# Patient Record
Sex: MALE | Race: WHITE | Employment: OTHER | URBAN - METROPOLITAN AREA
[De-identification: names, ages, dates, MRNs, and addresses within clinical notes are randomized per-mention and may not be internally consistent; named-entity substitution may affect disease eponyms.]

---

## 2019-06-13 ENCOUNTER — ANESTHESIA EVENT (OUTPATIENT)
Dept: OPERATING ROOM | Age: 84
DRG: 455 | End: 2019-06-13
Payer: MEDICARE

## 2019-06-14 ENCOUNTER — APPOINTMENT (OUTPATIENT)
Dept: GENERAL RADIOLOGY | Age: 84
DRG: 455 | End: 2019-06-14
Attending: ORTHOPAEDIC SURGERY
Payer: MEDICARE

## 2019-06-14 ENCOUNTER — ANESTHESIA (OUTPATIENT)
Dept: OPERATING ROOM | Age: 84
DRG: 455 | End: 2019-06-14
Payer: MEDICARE

## 2019-06-14 ENCOUNTER — HOSPITAL ENCOUNTER (INPATIENT)
Age: 84
LOS: 5 days | Discharge: NURSING, REHAB OR LONG-TERM CARE | DRG: 455 | End: 2019-06-19
Attending: ORTHOPAEDIC SURGERY | Admitting: ORTHOPAEDIC SURGERY
Payer: MEDICARE

## 2019-06-14 VITALS — DIASTOLIC BLOOD PRESSURE: 76 MMHG | TEMPERATURE: 96.3 F | OXYGEN SATURATION: 94 % | SYSTOLIC BLOOD PRESSURE: 137 MMHG

## 2019-06-14 DIAGNOSIS — I48.20 ATRIAL FIBRILLATION, CHRONIC (HCC): ICD-10-CM

## 2019-06-14 PROBLEM — M48.062 LUMBAR STENOSIS WITH NEUROGENIC CLAUDICATION: Status: ACTIVE | Noted: 2019-06-14

## 2019-06-14 LAB
ABO/RH: NORMAL
ANTIBODY SCREEN: NORMAL
INR BLD: 1.2
PROTHROMBIN TIME: 12.1 SEC (ref 9–11.5)

## 2019-06-14 PROCEDURE — 2500000003 HC RX 250 WO HCPCS: Performed by: NURSE ANESTHETIST, CERTIFIED REGISTERED

## 2019-06-14 PROCEDURE — 2580000003 HC RX 258: Performed by: NURSE PRACTITIONER

## 2019-06-14 PROCEDURE — 0SG30AJ FUSION OF LUMBOSACRAL JOINT WITH INTERBODY FUSION DEVICE, POSTERIOR APPROACH, ANTERIOR COLUMN, OPEN APPROACH: ICD-10-PCS | Performed by: ORTHOPAEDIC SURGERY

## 2019-06-14 PROCEDURE — 00NY0ZZ RELEASE LUMBAR SPINAL CORD, OPEN APPROACH: ICD-10-PCS | Performed by: ORTHOPAEDIC SURGERY

## 2019-06-14 PROCEDURE — 3600000014 HC SURGERY LEVEL 4 ADDTL 15MIN: Performed by: ORTHOPAEDIC SURGERY

## 2019-06-14 PROCEDURE — 2780000010 HC IMPLANT OTHER: Performed by: ORTHOPAEDIC SURGERY

## 2019-06-14 PROCEDURE — 85610 PROTHROMBIN TIME: CPT

## 2019-06-14 PROCEDURE — 0SG30J1 FUSION OF LUMBOSACRAL JOINT WITH SYNTHETIC SUBSTITUTE, POSTERIOR APPROACH, POSTERIOR COLUMN, OPEN APPROACH: ICD-10-PCS | Performed by: ORTHOPAEDIC SURGERY

## 2019-06-14 PROCEDURE — 86850 RBC ANTIBODY SCREEN: CPT

## 2019-06-14 PROCEDURE — 6360000002 HC RX W HCPCS: Performed by: ORTHOPAEDIC SURGERY

## 2019-06-14 PROCEDURE — 1210000000 HC MED SURG R&B

## 2019-06-14 PROCEDURE — 3700000000 HC ANESTHESIA ATTENDED CARE: Performed by: ORTHOPAEDIC SURGERY

## 2019-06-14 PROCEDURE — C1713 ANCHOR/SCREW BN/BN,TIS/BN: HCPCS | Performed by: ORTHOPAEDIC SURGERY

## 2019-06-14 PROCEDURE — 2580000003 HC RX 258: Performed by: NURSE ANESTHETIST, CERTIFIED REGISTERED

## 2019-06-14 PROCEDURE — 2709999900 HC NON-CHARGEABLE SUPPLY: Performed by: ORTHOPAEDIC SURGERY

## 2019-06-14 PROCEDURE — 3600000004 HC SURGERY LEVEL 4 BASE: Performed by: ORTHOPAEDIC SURGERY

## 2019-06-14 PROCEDURE — 6370000000 HC RX 637 (ALT 250 FOR IP): Performed by: ORTHOPAEDIC SURGERY

## 2019-06-14 PROCEDURE — 86900 BLOOD TYPING SEROLOGIC ABO: CPT

## 2019-06-14 PROCEDURE — 4A1004G MONITORING OF CENTRAL NERVOUS ELECTRICAL ACTIVITY, INTRAOPERATIVE, OPEN APPROACH: ICD-10-PCS | Performed by: ORTHOPAEDIC SURGERY

## 2019-06-14 PROCEDURE — 2720000010 HC SURG SUPPLY STERILE: Performed by: ORTHOPAEDIC SURGERY

## 2019-06-14 PROCEDURE — 7100000000 HC PACU RECOVERY - FIRST 15 MIN: Performed by: ORTHOPAEDIC SURGERY

## 2019-06-14 PROCEDURE — 2580000003 HC RX 258: Performed by: ORTHOPAEDIC SURGERY

## 2019-06-14 PROCEDURE — 86901 BLOOD TYPING SEROLOGIC RH(D): CPT

## 2019-06-14 PROCEDURE — 94150 VITAL CAPACITY TEST: CPT

## 2019-06-14 PROCEDURE — 6370000000 HC RX 637 (ALT 250 FOR IP): Performed by: NURSE PRACTITIONER

## 2019-06-14 PROCEDURE — 76000 FLUOROSCOPY <1 HR PHYS/QHP: CPT

## 2019-06-14 PROCEDURE — 7100000001 HC PACU RECOVERY - ADDTL 15 MIN: Performed by: ORTHOPAEDIC SURGERY

## 2019-06-14 PROCEDURE — 01NB0ZZ RELEASE LUMBAR NERVE, OPEN APPROACH: ICD-10-PCS | Performed by: ORTHOPAEDIC SURGERY

## 2019-06-14 PROCEDURE — 01NR0ZZ RELEASE SACRAL NERVE, OPEN APPROACH: ICD-10-PCS | Performed by: ORTHOPAEDIC SURGERY

## 2019-06-14 PROCEDURE — 0SB40ZZ EXCISION OF LUMBOSACRAL DISC, OPEN APPROACH: ICD-10-PCS | Performed by: ORTHOPAEDIC SURGERY

## 2019-06-14 PROCEDURE — 6360000002 HC RX W HCPCS: Performed by: NURSE ANESTHETIST, CERTIFIED REGISTERED

## 2019-06-14 PROCEDURE — 3700000001 HC ADD 15 MINUTES (ANESTHESIA): Performed by: ORTHOPAEDIC SURGERY

## 2019-06-14 DEVICE — 6.5MM REVOLVE POLYAXIAL PEDICLE SCREW, 50MM
Type: IMPLANTABLE DEVICE | Status: FUNCTIONAL
Brand: REVOLVE

## 2019-06-14 DEVICE — RISE SPACER 10X22MM, 7-13MM
Type: IMPLANTABLE DEVICE | Status: FUNCTIONAL
Brand: RISE

## 2019-06-14 DEVICE — 6.5MM REVOLVE POLYAXIAL PEDICLE SCREW, 45MM
Type: IMPLANTABLE DEVICE | Status: FUNCTIONAL
Brand: REVOLVE

## 2019-06-14 DEVICE — REVOLVE LOCKING CAP
Type: IMPLANTABLE DEVICE | Status: FUNCTIONAL
Brand: REVOLVE

## 2019-06-14 DEVICE — 5.5MM CURVED ROD, CONE TIP, 45MM
Type: IMPLANTABLE DEVICE | Status: FUNCTIONAL
Brand: REVERE

## 2019-06-14 DEVICE — GRAFT BNE PTTY 1-2 MM 7.5 CC SYNTH KT APPL ACTIFUSE MIS: Type: IMPLANTABLE DEVICE | Status: FUNCTIONAL

## 2019-06-14 DEVICE — 5.5MM CURVED ROD, CONE TIP, 50MM
Type: IMPLANTABLE DEVICE | Status: FUNCTIONAL
Brand: REVERE

## 2019-06-14 RX ORDER — OXYCODONE HYDROCHLORIDE AND ACETAMINOPHEN 5; 325 MG/1; MG/1
1 TABLET ORAL EVERY 4 HOURS PRN
Status: DISCONTINUED | OUTPATIENT
Start: 2019-06-14 | End: 2019-06-19 | Stop reason: HOSPADM

## 2019-06-14 RX ORDER — FENTANYL CITRATE 50 UG/ML
50 INJECTION, SOLUTION INTRAMUSCULAR; INTRAVENOUS EVERY 10 MIN PRN
Status: DISCONTINUED | OUTPATIENT
Start: 2019-06-14 | End: 2019-06-14 | Stop reason: HOSPADM

## 2019-06-14 RX ORDER — SODIUM CHLORIDE 0.9 % (FLUSH) 0.9 %
10 SYRINGE (ML) INJECTION EVERY 12 HOURS SCHEDULED
Status: DISCONTINUED | OUTPATIENT
Start: 2019-06-14 | End: 2019-06-19 | Stop reason: HOSPADM

## 2019-06-14 RX ORDER — SODIUM CHLORIDE 9 MG/ML
INJECTION, SOLUTION INTRAVENOUS CONTINUOUS
Status: DISCONTINUED | OUTPATIENT
Start: 2019-06-14 | End: 2019-06-19 | Stop reason: HOSPADM

## 2019-06-14 RX ORDER — MAGNESIUM HYDROXIDE 1200 MG/15ML
LIQUID ORAL CONTINUOUS PRN
Status: COMPLETED | OUTPATIENT
Start: 2019-06-14 | End: 2019-06-14

## 2019-06-14 RX ORDER — CARVEDILOL 6.25 MG/1
6.25 TABLET ORAL 2 TIMES DAILY WITH MEALS
Status: DISCONTINUED | OUTPATIENT
Start: 2019-06-14 | End: 2019-06-19 | Stop reason: HOSPADM

## 2019-06-14 RX ORDER — CEFAZOLIN SODIUM 2 G/50ML
2 SOLUTION INTRAVENOUS EVERY 8 HOURS
Status: COMPLETED | OUTPATIENT
Start: 2019-06-14 | End: 2019-06-15

## 2019-06-14 RX ORDER — ONDANSETRON 2 MG/ML
4 INJECTION INTRAMUSCULAR; INTRAVENOUS
Status: DISCONTINUED | OUTPATIENT
Start: 2019-06-14 | End: 2019-06-14 | Stop reason: HOSPADM

## 2019-06-14 RX ORDER — DIAZEPAM 5 MG/1
5 TABLET ORAL EVERY 6 HOURS PRN
Status: DISCONTINUED | OUTPATIENT
Start: 2019-06-14 | End: 2019-06-19 | Stop reason: HOSPADM

## 2019-06-14 RX ORDER — SODIUM CHLORIDE 0.9 % (FLUSH) 0.9 %
10 SYRINGE (ML) INJECTION PRN
Status: DISCONTINUED | OUTPATIENT
Start: 2019-06-14 | End: 2019-06-14 | Stop reason: HOSPADM

## 2019-06-14 RX ORDER — HYDROCODONE BITARTRATE AND ACETAMINOPHEN 5; 325 MG/1; MG/1
2 TABLET ORAL PRN
Status: DISCONTINUED | OUTPATIENT
Start: 2019-06-14 | End: 2019-06-14 | Stop reason: HOSPADM

## 2019-06-14 RX ORDER — SODIUM CHLORIDE 0.9 % (FLUSH) 0.9 %
10 SYRINGE (ML) INJECTION EVERY 12 HOURS SCHEDULED
Status: DISCONTINUED | OUTPATIENT
Start: 2019-06-14 | End: 2019-06-14 | Stop reason: HOSPADM

## 2019-06-14 RX ORDER — LIDOCAINE HYDROCHLORIDE 10 MG/ML
1 INJECTION, SOLUTION EPIDURAL; INFILTRATION; INTRACAUDAL; PERINEURAL
Status: DISCONTINUED | OUTPATIENT
Start: 2019-06-14 | End: 2019-06-14 | Stop reason: HOSPADM

## 2019-06-14 RX ORDER — ONDANSETRON 2 MG/ML
4 INJECTION INTRAMUSCULAR; INTRAVENOUS EVERY 6 HOURS PRN
Status: DISCONTINUED | OUTPATIENT
Start: 2019-06-14 | End: 2019-06-19 | Stop reason: HOSPADM

## 2019-06-14 RX ORDER — AMIODARONE HYDROCHLORIDE 200 MG/1
100 TABLET ORAL DAILY
Status: DISCONTINUED | OUTPATIENT
Start: 2019-06-14 | End: 2019-06-19 | Stop reason: HOSPADM

## 2019-06-14 RX ORDER — ONDANSETRON 2 MG/ML
INJECTION INTRAMUSCULAR; INTRAVENOUS PRN
Status: DISCONTINUED | OUTPATIENT
Start: 2019-06-14 | End: 2019-06-14 | Stop reason: SDUPTHER

## 2019-06-14 RX ORDER — MEPERIDINE HYDROCHLORIDE 25 MG/ML
12.5 INJECTION INTRAMUSCULAR; INTRAVENOUS; SUBCUTANEOUS EVERY 5 MIN PRN
Status: DISCONTINUED | OUTPATIENT
Start: 2019-06-14 | End: 2019-06-14 | Stop reason: HOSPADM

## 2019-06-14 RX ORDER — LIDOCAINE HYDROCHLORIDE 20 MG/ML
INJECTION, SOLUTION INTRAVENOUS PRN
Status: DISCONTINUED | OUTPATIENT
Start: 2019-06-14 | End: 2019-06-14 | Stop reason: SDUPTHER

## 2019-06-14 RX ORDER — LISINOPRIL 10 MG/1
10 TABLET ORAL DAILY
Status: ON HOLD | COMMUNITY
End: 2019-06-19 | Stop reason: HOSPADM

## 2019-06-14 RX ORDER — TORSEMIDE 20 MG/1
20 TABLET ORAL DAILY
COMMUNITY

## 2019-06-14 RX ORDER — METOCLOPRAMIDE HYDROCHLORIDE 5 MG/ML
10 INJECTION INTRAMUSCULAR; INTRAVENOUS
Status: DISCONTINUED | OUTPATIENT
Start: 2019-06-14 | End: 2019-06-14 | Stop reason: HOSPADM

## 2019-06-14 RX ORDER — MORPHINE SULFATE 4 MG/ML
4 INJECTION, SOLUTION INTRAMUSCULAR; INTRAVENOUS
Status: DISCONTINUED | OUTPATIENT
Start: 2019-06-14 | End: 2019-06-19 | Stop reason: HOSPADM

## 2019-06-14 RX ORDER — SODIUM CHLORIDE 0.9 % (FLUSH) 0.9 %
10 SYRINGE (ML) INJECTION PRN
Status: DISCONTINUED | OUTPATIENT
Start: 2019-06-14 | End: 2019-06-19 | Stop reason: HOSPADM

## 2019-06-14 RX ORDER — GABAPENTIN 300 MG/1
300 CAPSULE ORAL 2 TIMES DAILY
COMMUNITY

## 2019-06-14 RX ORDER — WARFARIN SODIUM 5 MG/1
5 TABLET ORAL DAILY
Status: ON HOLD | COMMUNITY
End: 2019-06-19 | Stop reason: SDUPTHER

## 2019-06-14 RX ORDER — EPHEDRINE SULFATE/0.9% NACL/PF 50 MG/5 ML
SYRINGE (ML) INTRAVENOUS PRN
Status: DISCONTINUED | OUTPATIENT
Start: 2019-06-14 | End: 2019-06-14 | Stop reason: SDUPTHER

## 2019-06-14 RX ORDER — DEXAMETHASONE SODIUM PHOSPHATE 10 MG/ML
INJECTION INTRAMUSCULAR; INTRAVENOUS PRN
Status: DISCONTINUED | OUTPATIENT
Start: 2019-06-14 | End: 2019-06-14 | Stop reason: SDUPTHER

## 2019-06-14 RX ORDER — DIPHENHYDRAMINE HYDROCHLORIDE 50 MG/ML
12.5 INJECTION INTRAMUSCULAR; INTRAVENOUS
Status: DISCONTINUED | OUTPATIENT
Start: 2019-06-14 | End: 2019-06-14 | Stop reason: HOSPADM

## 2019-06-14 RX ORDER — CARVEDILOL 6.25 MG/1
6.25 TABLET ORAL 2 TIMES DAILY WITH MEALS
COMMUNITY

## 2019-06-14 RX ORDER — CEFAZOLIN SODIUM 2 G/50ML
2 SOLUTION INTRAVENOUS
Status: COMPLETED | OUTPATIENT
Start: 2019-06-14 | End: 2019-06-14

## 2019-06-14 RX ORDER — GABAPENTIN 300 MG/1
300 CAPSULE ORAL 2 TIMES DAILY
Status: DISCONTINUED | OUTPATIENT
Start: 2019-06-14 | End: 2019-06-19 | Stop reason: HOSPADM

## 2019-06-14 RX ORDER — ATORVASTATIN CALCIUM 10 MG/1
10 TABLET, FILM COATED ORAL DAILY
Status: DISCONTINUED | OUTPATIENT
Start: 2019-06-14 | End: 2019-06-19 | Stop reason: HOSPADM

## 2019-06-14 RX ORDER — TAMSULOSIN HYDROCHLORIDE 0.4 MG/1
0.4 CAPSULE ORAL DAILY
Status: DISCONTINUED | OUTPATIENT
Start: 2019-06-14 | End: 2019-06-19 | Stop reason: HOSPADM

## 2019-06-14 RX ORDER — PROPOFOL 10 MG/ML
INJECTION, EMULSION INTRAVENOUS PRN
Status: DISCONTINUED | OUTPATIENT
Start: 2019-06-14 | End: 2019-06-14 | Stop reason: SDUPTHER

## 2019-06-14 RX ORDER — NITROGLYCERIN 0.4 MG/1
0.4 TABLET SUBLINGUAL EVERY 5 MIN PRN
Status: DISCONTINUED | OUTPATIENT
Start: 2019-06-14 | End: 2019-06-19 | Stop reason: HOSPADM

## 2019-06-14 RX ORDER — VANCOMYCIN HYDROCHLORIDE 1 G/20ML
INJECTION, POWDER, LYOPHILIZED, FOR SOLUTION INTRAVENOUS PRN
Status: DISCONTINUED | OUTPATIENT
Start: 2019-06-14 | End: 2019-06-14 | Stop reason: ALTCHOICE

## 2019-06-14 RX ORDER — ALLOPURINOL 100 MG/1
100 TABLET ORAL DAILY
COMMUNITY

## 2019-06-14 RX ORDER — PROPOFOL 10 MG/ML
INJECTION, EMULSION INTRAVENOUS CONTINUOUS PRN
Status: DISCONTINUED | OUTPATIENT
Start: 2019-06-14 | End: 2019-06-14 | Stop reason: SDUPTHER

## 2019-06-14 RX ORDER — NITROGLYCERIN 0.4 MG/1
0.4 TABLET SUBLINGUAL EVERY 5 MIN PRN
COMMUNITY

## 2019-06-14 RX ORDER — FENTANYL CITRATE 50 UG/ML
INJECTION, SOLUTION INTRAMUSCULAR; INTRAVENOUS PRN
Status: DISCONTINUED | OUTPATIENT
Start: 2019-06-14 | End: 2019-06-14 | Stop reason: SDUPTHER

## 2019-06-14 RX ORDER — AMIODARONE HYDROCHLORIDE 200 MG/1
200 TABLET ORAL DAILY
Status: ON HOLD | COMMUNITY
End: 2019-06-19 | Stop reason: HOSPADM

## 2019-06-14 RX ORDER — TAMSULOSIN HYDROCHLORIDE 0.4 MG/1
0.4 CAPSULE ORAL DAILY
COMMUNITY

## 2019-06-14 RX ORDER — MORPHINE SULFATE 2 MG/ML
2 INJECTION, SOLUTION INTRAMUSCULAR; INTRAVENOUS
Status: DISCONTINUED | OUTPATIENT
Start: 2019-06-14 | End: 2019-06-19 | Stop reason: HOSPADM

## 2019-06-14 RX ORDER — ATORVASTATIN CALCIUM 10 MG/1
10 TABLET, FILM COATED ORAL DAILY
COMMUNITY

## 2019-06-14 RX ORDER — HYDROCODONE BITARTRATE AND ACETAMINOPHEN 5; 325 MG/1; MG/1
1 TABLET ORAL PRN
Status: DISCONTINUED | OUTPATIENT
Start: 2019-06-14 | End: 2019-06-14 | Stop reason: HOSPADM

## 2019-06-14 RX ORDER — SODIUM CHLORIDE, SODIUM LACTATE, POTASSIUM CHLORIDE, CALCIUM CHLORIDE 600; 310; 30; 20 MG/100ML; MG/100ML; MG/100ML; MG/100ML
INJECTION, SOLUTION INTRAVENOUS CONTINUOUS
Status: DISCONTINUED | OUTPATIENT
Start: 2019-06-14 | End: 2019-06-14

## 2019-06-14 RX ORDER — DOCUSATE SODIUM 100 MG/1
100 CAPSULE, LIQUID FILLED ORAL 2 TIMES DAILY
Status: DISCONTINUED | OUTPATIENT
Start: 2019-06-14 | End: 2019-06-19 | Stop reason: HOSPADM

## 2019-06-14 RX ORDER — TORSEMIDE 20 MG/1
20 TABLET ORAL DAILY
Status: DISCONTINUED | OUTPATIENT
Start: 2019-06-15 | End: 2019-06-19 | Stop reason: HOSPADM

## 2019-06-14 RX ORDER — OXYCODONE HYDROCHLORIDE AND ACETAMINOPHEN 5; 325 MG/1; MG/1
2 TABLET ORAL EVERY 4 HOURS PRN
Status: DISCONTINUED | OUTPATIENT
Start: 2019-06-14 | End: 2019-06-19 | Stop reason: HOSPADM

## 2019-06-14 RX ORDER — LISINOPRIL 10 MG/1
10 TABLET ORAL DAILY
Status: DISCONTINUED | OUTPATIENT
Start: 2019-06-15 | End: 2019-06-15

## 2019-06-14 RX ORDER — ALLOPURINOL 100 MG/1
100 TABLET ORAL DAILY
Status: DISCONTINUED | OUTPATIENT
Start: 2019-06-14 | End: 2019-06-19 | Stop reason: HOSPADM

## 2019-06-14 RX ORDER — ROCURONIUM BROMIDE 10 MG/ML
INJECTION, SOLUTION INTRAVENOUS PRN
Status: DISCONTINUED | OUTPATIENT
Start: 2019-06-14 | End: 2019-06-14 | Stop reason: SDUPTHER

## 2019-06-14 RX ORDER — WOUND DRESSING ADHESIVE - LIQUID
LIQUID MISCELLANEOUS PRN
Status: DISCONTINUED | OUTPATIENT
Start: 2019-06-14 | End: 2019-06-14 | Stop reason: ALTCHOICE

## 2019-06-14 RX ADMIN — TAMSULOSIN HYDROCHLORIDE 0.4 MG: 0.4 CAPSULE ORAL at 21:27

## 2019-06-14 RX ADMIN — DEXAMETHASONE SODIUM PHOSPHATE 10 MG: 10 INJECTION INTRAMUSCULAR; INTRAVENOUS at 08:29

## 2019-06-14 RX ADMIN — Medication 5 MG: at 07:52

## 2019-06-14 RX ADMIN — CEFAZOLIN SODIUM 2 G: 2 SOLUTION INTRAVENOUS at 16:01

## 2019-06-14 RX ADMIN — PROPOFOL 50 MCG/KG/MIN: 10 INJECTION, EMULSION INTRAVENOUS at 07:55

## 2019-06-14 RX ADMIN — PHENYLEPHRINE HYDROCHLORIDE 100 MCG: 10 INJECTION INTRAVENOUS at 07:55

## 2019-06-14 RX ADMIN — SODIUM CHLORIDE, POTASSIUM CHLORIDE, SODIUM LACTATE AND CALCIUM CHLORIDE: 600; 310; 30; 20 INJECTION, SOLUTION INTRAVENOUS at 07:37

## 2019-06-14 RX ADMIN — GABAPENTIN 300 MG: 300 CAPSULE ORAL at 21:27

## 2019-06-14 RX ADMIN — CEFAZOLIN SODIUM 2 G: 2 SOLUTION INTRAVENOUS at 07:58

## 2019-06-14 RX ADMIN — SUGAMMADEX 50 MG: 100 INJECTION, SOLUTION INTRAVENOUS at 09:43

## 2019-06-14 RX ADMIN — CEFAZOLIN SODIUM 2 G: 2 SOLUTION INTRAVENOUS at 23:44

## 2019-06-14 RX ADMIN — OXYCODONE HYDROCHLORIDE AND ACETAMINOPHEN 1 TABLET: 5; 325 TABLET ORAL at 14:46

## 2019-06-14 RX ADMIN — Medication 10 MG: at 08:30

## 2019-06-14 RX ADMIN — SODIUM CHLORIDE, POTASSIUM CHLORIDE, SODIUM LACTATE AND CALCIUM CHLORIDE: 600; 310; 30; 20 INJECTION, SOLUTION INTRAVENOUS at 11:48

## 2019-06-14 RX ADMIN — PHENYLEPHRINE HYDROCHLORIDE 100 MCG: 10 INJECTION INTRAVENOUS at 07:51

## 2019-06-14 RX ADMIN — ATORVASTATIN CALCIUM 10 MG: 10 TABLET, FILM COATED ORAL at 21:27

## 2019-06-14 RX ADMIN — ROCURONIUM BROMIDE 20 MG: 10 INJECTION INTRAVENOUS at 08:40

## 2019-06-14 RX ADMIN — ROCURONIUM BROMIDE 50 MG: 10 INJECTION INTRAVENOUS at 07:43

## 2019-06-14 RX ADMIN — DOCUSATE SODIUM 100 MG: 100 CAPSULE, LIQUID FILLED ORAL at 14:46

## 2019-06-14 RX ADMIN — DIAZEPAM 5 MG: 5 TABLET ORAL at 14:46

## 2019-06-14 RX ADMIN — OXYCODONE HYDROCHLORIDE AND ACETAMINOPHEN 1 TABLET: 5; 325 TABLET ORAL at 18:41

## 2019-06-14 RX ADMIN — SODIUM CHLORIDE, POTASSIUM CHLORIDE, SODIUM LACTATE AND CALCIUM CHLORIDE: 600; 310; 30; 20 INJECTION, SOLUTION INTRAVENOUS at 09:04

## 2019-06-14 RX ADMIN — DOCUSATE SODIUM 100 MG: 100 CAPSULE, LIQUID FILLED ORAL at 21:27

## 2019-06-14 RX ADMIN — PHENYLEPHRINE HYDROCHLORIDE 100 MCG: 10 INJECTION INTRAVENOUS at 07:53

## 2019-06-14 RX ADMIN — ALLOPURINOL 100 MG: 100 TABLET ORAL at 21:27

## 2019-06-14 RX ADMIN — PHENYLEPHRINE HYDROCHLORIDE 100 MCG: 10 INJECTION INTRAVENOUS at 08:04

## 2019-06-14 RX ADMIN — FENTANYL CITRATE 25 MCG: 50 INJECTION, SOLUTION INTRAMUSCULAR; INTRAVENOUS at 11:48

## 2019-06-14 RX ADMIN — PROPOFOL 150 MG: 10 INJECTION, EMULSION INTRAVENOUS at 07:43

## 2019-06-14 RX ADMIN — OXYCODONE HYDROCHLORIDE AND ACETAMINOPHEN 1 TABLET: 5; 325 TABLET ORAL at 23:45

## 2019-06-14 RX ADMIN — Medication 5 MG: at 07:49

## 2019-06-14 RX ADMIN — SUGAMMADEX 150 MG: 100 INJECTION, SOLUTION INTRAVENOUS at 10:52

## 2019-06-14 RX ADMIN — AMIODARONE HYDROCHLORIDE 100 MG: 200 TABLET ORAL at 18:41

## 2019-06-14 RX ADMIN — PHENYLEPHRINE HYDROCHLORIDE 150 MCG: 10 INJECTION INTRAVENOUS at 07:48

## 2019-06-14 RX ADMIN — PHENYLEPHRINE HYDROCHLORIDE 50 MCG: 10 INJECTION INTRAVENOUS at 07:47

## 2019-06-14 RX ADMIN — CARVEDILOL 6.25 MG: 6.25 TABLET, FILM COATED ORAL at 18:41

## 2019-06-14 RX ADMIN — PHENYLEPHRINE HYDROCHLORIDE 200 MCG: 10 INJECTION INTRAVENOUS at 08:02

## 2019-06-14 RX ADMIN — SODIUM CHLORIDE, POTASSIUM CHLORIDE, SODIUM LACTATE AND CALCIUM CHLORIDE 125 ML/HR: 600; 310; 30; 20 INJECTION, SOLUTION INTRAVENOUS at 06:30

## 2019-06-14 RX ADMIN — ONDANSETRON 4 MG: 2 INJECTION INTRAMUSCULAR; INTRAVENOUS at 10:41

## 2019-06-14 RX ADMIN — Medication 10 MG: at 11:00

## 2019-06-14 RX ADMIN — MORPHINE SULFATE 2 MG: 2 INJECTION, SOLUTION INTRAMUSCULAR; INTRAVENOUS at 16:26

## 2019-06-14 RX ADMIN — MORPHINE SULFATE 4 MG: 4 INJECTION INTRAVENOUS at 13:25

## 2019-06-14 RX ADMIN — SODIUM CHLORIDE 125 ML/HR: 9 INJECTION, SOLUTION INTRAVENOUS at 16:01

## 2019-06-14 RX ADMIN — FENTANYL CITRATE 50 MCG: 50 INJECTION, SOLUTION INTRAMUSCULAR; INTRAVENOUS at 07:43

## 2019-06-14 RX ADMIN — LIDOCAINE HYDROCHLORIDE 50 MG: 20 INJECTION, SOLUTION INTRAVENOUS at 07:43

## 2019-06-14 RX ADMIN — PHENYLEPHRINE HYDROCHLORIDE 20 MCG/MIN: 10 INJECTION INTRAVENOUS at 08:03

## 2019-06-14 RX ADMIN — PHENYLEPHRINE HYDROCHLORIDE 100 MCG: 10 INJECTION INTRAVENOUS at 07:49

## 2019-06-14 ASSESSMENT — PULMONARY FUNCTION TESTS
PIF_VALUE: 17
PIF_VALUE: 10
PIF_VALUE: 15
PIF_VALUE: 18
PIF_VALUE: 17
PIF_VALUE: 17
PIF_VALUE: 16
PIF_VALUE: 17
PIF_VALUE: 15
PIF_VALUE: 17
PIF_VALUE: 15
PIF_VALUE: 17
PIF_VALUE: 16
PIF_VALUE: 17
PIF_VALUE: 15
PIF_VALUE: 16
PIF_VALUE: 16
PIF_VALUE: 15
PIF_VALUE: 17
PIF_VALUE: 16
PIF_VALUE: 17
PIF_VALUE: 12
PIF_VALUE: 16
PIF_VALUE: 1
PIF_VALUE: 5
PIF_VALUE: 17
PIF_VALUE: 17
PIF_VALUE: 1
PIF_VALUE: 17
PIF_VALUE: 17
PIF_VALUE: 15
PIF_VALUE: 17
PIF_VALUE: 15
PIF_VALUE: 17
PIF_VALUE: 16
PIF_VALUE: 18
PIF_VALUE: 17
PIF_VALUE: 15
PIF_VALUE: 17
PIF_VALUE: 15
PIF_VALUE: 12
PIF_VALUE: 17
PIF_VALUE: 15
PIF_VALUE: 17
PIF_VALUE: 17
PIF_VALUE: 15
PIF_VALUE: 17
PIF_VALUE: 15
PIF_VALUE: 15
PIF_VALUE: 17
PIF_VALUE: 16
PIF_VALUE: 17
PIF_VALUE: 15
PIF_VALUE: 17
PIF_VALUE: 17
PIF_VALUE: 15
PIF_VALUE: 17
PIF_VALUE: 3
PIF_VALUE: 16
PIF_VALUE: 16
PIF_VALUE: 17
PIF_VALUE: 12
PIF_VALUE: 17
PIF_VALUE: 1
PIF_VALUE: 16
PIF_VALUE: 17
PIF_VALUE: 15
PIF_VALUE: 17
PIF_VALUE: 0
PIF_VALUE: 17
PIF_VALUE: 15
PIF_VALUE: 15
PIF_VALUE: 17
PIF_VALUE: 15
PIF_VALUE: 17
PIF_VALUE: 17
PIF_VALUE: 15
PIF_VALUE: 15
PIF_VALUE: 16
PIF_VALUE: 17
PIF_VALUE: 15
PIF_VALUE: 12
PIF_VALUE: 17
PIF_VALUE: 16
PIF_VALUE: 17
PIF_VALUE: 15
PIF_VALUE: 15
PIF_VALUE: 16
PIF_VALUE: 17
PIF_VALUE: 15
PIF_VALUE: 17
PIF_VALUE: 16
PIF_VALUE: 17
PIF_VALUE: 15
PIF_VALUE: 17
PIF_VALUE: 15
PIF_VALUE: 17
PIF_VALUE: 17
PIF_VALUE: 16
PIF_VALUE: 17
PIF_VALUE: 15
PIF_VALUE: 17
PIF_VALUE: 16
PIF_VALUE: 17
PIF_VALUE: 16
PIF_VALUE: 7
PIF_VALUE: 15
PIF_VALUE: 17
PIF_VALUE: 16
PIF_VALUE: 17
PIF_VALUE: 17
PIF_VALUE: 15
PIF_VALUE: 16
PIF_VALUE: 17
PIF_VALUE: 17
PIF_VALUE: 16
PIF_VALUE: 17
PIF_VALUE: 1
PIF_VALUE: 17
PIF_VALUE: 17
PIF_VALUE: 4
PIF_VALUE: 1
PIF_VALUE: 15
PIF_VALUE: 16
PIF_VALUE: 17
PIF_VALUE: 16
PIF_VALUE: 17
PIF_VALUE: 15
PIF_VALUE: 17
PIF_VALUE: 7
PIF_VALUE: 15
PIF_VALUE: 17
PIF_VALUE: 16
PIF_VALUE: 16
PIF_VALUE: 17
PIF_VALUE: 15
PIF_VALUE: 17
PIF_VALUE: 17
PIF_VALUE: 16
PIF_VALUE: 17
PIF_VALUE: 15
PIF_VALUE: 16
PIF_VALUE: 17
PIF_VALUE: 16
PIF_VALUE: 17
PIF_VALUE: 15
PIF_VALUE: 17
PIF_VALUE: 1
PIF_VALUE: 17
PIF_VALUE: 15
PIF_VALUE: 16
PIF_VALUE: 17
PIF_VALUE: 15
PIF_VALUE: 17
PIF_VALUE: 1
PIF_VALUE: 17
PIF_VALUE: 17
PIF_VALUE: 12
PIF_VALUE: 18
PIF_VALUE: 16
PIF_VALUE: 15
PIF_VALUE: 18
PIF_VALUE: 15
PIF_VALUE: 17
PIF_VALUE: 16
PIF_VALUE: 17
PIF_VALUE: 16
PIF_VALUE: 15
PIF_VALUE: 17
PIF_VALUE: 16
PIF_VALUE: 17
PIF_VALUE: 16
PIF_VALUE: 18
PIF_VALUE: 17
PIF_VALUE: 16
PIF_VALUE: 17
PIF_VALUE: 15
PIF_VALUE: 17
PIF_VALUE: 12
PIF_VALUE: 17

## 2019-06-14 ASSESSMENT — PAIN SCALES - GENERAL
PAINLEVEL_OUTOF10: 10
PAINLEVEL_OUTOF10: 3

## 2019-06-14 NOTE — CONSULTS
Consult Note    Reason for Consult:  Medical management     Requesting Physician:  Herrera Robin MD    HISTORY OF PRESENT ILLNESS:    The patient is a 80 y.o. male with history of CAD s/p PCI, HTN, HLD,Gout, hernia,  A.fib, BPH with indwelling urinary catheter,  lung cancer s/p right partial lobectomy  and lumbar stenosis who was admitted following elective L3-S1 laminectomy and fusion. Per wife at the bedside pt had a fall in 2016 and hasn't been able to ambulate well since then with asociated pain. She however added that pt had lung cancer which need to be addresses first and states now pt is cancer free. Pt states surgery went well, he denies any N/V, lightheaded, numbness , tingling or decreased sensation in extremities at this time. Pt also has a chronic call d/t bladder weakness and BPH per wife     Medical Hx  As above       Surgical Hx  Hernia repair  Right Total knee arthroplasty  TURP      Prior to Admission medications    Medication Sig Start Date End Date Taking?  Authorizing Provider   torsemide (DEMADEX) 20 MG tablet Take 20 mg by mouth daily   Yes Historical Provider, MD   warfarin (COUMADIN) 5 MG tablet Take 5 mg by mouth daily    Yes Historical Provider, MD   carvedilol (COREG) 6.25 MG tablet Take 6.25 mg by mouth 2 times daily (with meals)   Yes Historical Provider, MD   atorvastatin (LIPITOR) 10 MG tablet Take 10 mg by mouth daily   Yes Historical Provider, MD   tamsulosin (FLOMAX) 0.4 MG capsule Take 0.4 mg by mouth daily    Yes Historical Provider, MD   allopurinol (ZYLOPRIM) 100 MG tablet Take 100 mg by mouth daily Indications: 4 tablets all at once   Yes Historical Provider, MD   aspirin 81 MG tablet Take 81 mg by mouth daily   Yes Historical Provider, MD   amiodarone (CORDARONE) 200 MG tablet Take 200 mg by mouth daily Indications: Half tablet daily    Yes Historical Provider, MD   lisinopril (PRINIVIL;ZESTRIL) 10 MG tablet Take 10 mg by mouth daily   Yes Historical Provider, MD gabapentin (NEURONTIN) 300 MG capsule Take 300 mg by mouth 2 times daily. Indications: takes 2 tablets nightly    Yes Historical Provider, MD   nitroGLYCERIN (NITROSTAT) 0.4 MG SL tablet Place 0.4 mg under the tongue every 5 minutes as needed for Chest pain up to max of 3 total doses. If no relief after 1 dose, call 911.     Historical Provider, MD       Scheduled Meds:   sodium chloride flush  10 mL Intravenous 2 times per day    ceFAZolin (ANCEF) IVPB  2 g Intravenous Q8H    docusate sodium  100 mg Oral BID     Continuous Infusions:   sodium chloride       PRN Meds:sodium chloride flush, oxyCODONE-acetaminophen **OR** oxyCODONE-acetaminophen, ondansetron, morphine **OR** morphine, diazepam    No Known Allergies    Social History     Socioeconomic History    Marital status:      Spouse name: Not on file    Number of children: Not on file    Years of education: Not on file    Highest education level: Not on file   Occupational History    Not on file   Social Needs    Financial resource strain: Not on file    Food insecurity:     Worry: Not on file     Inability: Not on file    Transportation needs:     Medical: Not on file     Non-medical: Not on file   Tobacco Use    Smoking status: Not on file   Substance and Sexual Activity    Alcohol use: Not on file    Drug use: Not on file    Sexual activity: Not on file   Lifestyle    Physical activity:     Days per week: Not on file     Minutes per session: Not on file    Stress: Not on file   Relationships    Social connections:     Talks on phone: Not on file     Gets together: Not on file     Attends Synagogue service: Not on file     Active member of club or organization: Not on file     Attends meetings of clubs or organizations: Not on file     Relationship status: Not on file    Intimate partner violence:     Fear of current or ex partner: Not on file     Emotionally abused: Not on file     Physically abused: Not on file     Forced sexual activity: Not on file   Other Topics Concern    Not on file   Social History Narrative    Not on file       No family history on file. Review Of Systems:   Constitutional: No fever, chills, weakness, otherwise negative  Eyes: No eye pain or redness, otherwise negative  Ears, nose, mouth, throat, and face: No ear ache, no nose bleed no sore throat otherwise negative  Respiratory: No cough, sob, hemoptysis, otherwise  negative  Cardiovascular: No chest pain, palpitation, otherwise negative  Gastrointestinal: No nausea, vomiting diarrhea otherwise negative  Genitourinary:No hematuria, no dysuria, no frequency otherwise negative  Integument/breast: No pain, discomfort, redness otherwise negative  Hematologic/lymphatic: No bleed, lymph node swelling, petechia otherwise negative  Musculoskeletal:low back pain, difficult ambulation   Neurological: No headache, seizure, loss of consciousness otherwise negative  Behavioral/Psych: No depression, suicidal or homicidal ideation otherwise negative  Endocrine: No thyroid pain, warmth or tenderness. No wt loss otherwise negative  Allergic/Immunologic: No sneezing, itching or rash otherwise negative      Physical Exam:  Vitals:    06/14/19 1416 06/14/19 1431 06/14/19 1446 06/14/19 1447   BP: 134/70 120/71 131/77    Pulse: 70 70 59 95   Resp:       Temp:       TempSrc:       SpO2: 100% 100% 100% 97%   Weight:       Height:         General appearance: , appears stated age and cooperative. HEENT: Pupils equal, round, and reactive to light. Conjunctivae/corneas clear., [place]  Neck: Supple, with full range of motion. Trachea midline. Respiratory:  Clear to auscultation,diminished in the bases   Cardiovascular: irregular rate and rhythm, S1S2. Abdomen: Soft, non-tender, non-distended with normal bowel sounds.   Musculoskeletal: No clubbing, cyanosis or edema bilaterally, post op dressing CDI in lower mid back    Skin: Skin color, texture, turgor normal.  No rashes or lesions. Psychiatric: Alert and oriented, thought content appropriate, normal insight  Capillary Refill: Brisk,< 3 seconds   Peripheral Pulses: +2 palpable, equal bilaterally     Labs:  Recent Results (from the past 24 hour(s))   PROTIME-INR    Collection Time: 06/14/19  6:30 AM   Result Value Ref Range    Protime 12.1 (H) 9.0 - 11.5 sec    INR 1.2    TYPE AND SCREEN    Collection Time: 06/14/19  6:40 AM   Result Value Ref Range    ABO/Rh O POS     Antibody Screen NEG      Assessment/Plan:    80 y.o. male with history of CAD s/p PCI, HTN, HLD,Gout, hernia,  A.fib, BPH with indwelling urinary catheter,  lung cancer s/p right partial lobectomy  and lumbar stenosis who was admitted following elective L3-S1 laminectomy and fusion. Lumbar stenosis with neurogenic claudication  - s/p elective L3-S1 laminectomy and fusion  - POD 0  - management per primary service    A.fib  - continue coreg and and amiodarone,   - resume coumadin when OK with surgical service  - monitor INR     CAD s/p PCI  - continue home meds     HTN  - resume home meds    BPH with chronic indwelling urinary catheter  - continue Flomax      Diet: general   Code Status: Full Code    DVT prophylaxis: per Dr Kamar Pillai      Additional work up or/and treatment plan may be added today or then after based on clinical progression. I am managing a portion of pt care. Some medical issues are handled by other specialists. Additional work up and treatment should be done in out pt setting by pt PCP and other out pt providers. In addition to examining and evaluating pt, I spent additional time explaining care, normal and abnormal findings, and treatment plan. All of pt questions were answered. Counseling, diet and education were  provided. Case will be discussed with nursing staff when appropriate. Family will be updated if and when appropriate.         Electronically signed by ARIANA Montes CNP on 6/14/2019 at 3:54 PM     Attending Supervising Cheri Rick  I saw and evaluated the patient.   I discussed the findings and plans with nurse practitioner and agree as documented in her note     Electronically signed by Ramila Shankar MD on 6/14/19 at 5:57 PM

## 2019-06-14 NOTE — DISCHARGE INSTR - COC
Continuity of Care Form    Patient Name: Jeffy Valentino   :  1934  MRN:  [id number]    Admit date:  2019  Discharge date:  2019    Code Status Order: No Order   Advance Directives:   [facility] Documentation     Date/Time Healthcare Directive Type of Healthcare Directive Copy in [facility] [address] Agent's Name Healthcare Agent's Phone Number    [phone]  No, patient does not have an advance directive for healthcare treatment -- -- -- -- --          Admitting Physician:  Stephanie Aquino MD  PCP: Tray Rowan MD    Discharging Nurse: [facility] Unit/Room#: Sushant Lugo [address] [address]  Discharging Unit Phone Number: [phone]    Emergency Contact:   Extended Emergency Contact Information  Primary Emergency Contact: Andrzej [address] Phone: [phone]  Relation: Spouse  Preferred language: English   needed? No  Secondary Emergency Contact: [facility]jah ALEJANDRO [facility] Phone: [phone]  Relation: Child  Preferred language: English   needed? No    Past Surgical History:  No past surgical history on file. Immunization History: There is no immunization history on file for this patient.     Active Problems:  Patient Active Problem List   Diagnosis Code    Lumbar stenosis with neurogenic claudication M48.062       Isolation/Infection:   Isolation          No Isolation            Nurse Assessment:  Last Vital Signs: BP (!) 144/66   Pulse 78   Temp 96.9 °F (36.1 °C) (Temporal)   Resp 16   Ht 6' (1.829 m)   Wt 210 lb (95.3 kg)   SpO2 94%   BMI 28.48 kg/m²     Last documented pain score (0-10 scale): Pain Level: 0  Last Weight:   Wt Readings from Last 1 Encounters:   19 210 lb (95.3 kg)     Mental Status:  oriented and alert    IV Access:  - None    Nursing Mobility/ADLs:  Walking   Assisted  Transfer  Assisted  Bathing  Assisted  Dressing  Assisted  Toileting  Assisted  Feeding  [address]  Assisted  Med Delivery   whole    Wound Care Documentation and Therapy:        Elimination:  Continence:   · Bowel: Yes  · Bladder: No  Urinary Catheter: Indication for Use of Catheter: chronic call   Colostomy/Ileostomy/Ileal Conduit: No       Date of Last BM: 06/18/2019    Intake/Output Summary (Last 24 hours) at 6/14/2019 1151  Last data filed at 6/14/2019 1148  Gross per 24 hour   Intake 2000 ml   Output 325 ml   Net 1675 ml     No intake/output data recorded. Safety Concerns: At Risk for Falls    Impairments/Disabilities:      None    Nutrition Therapy:  Current Nutrition Therapy:   - Oral Diet:  General    Routes of Feeding: Oral  Liquids: Thin Liquids  Daily Fluid Restriction: no  Last Modified Barium Swallow with Video (Video Swallowing Test): not done    Treatments at the Time of Hospital Discharge:   Respiratory Treatments: ***  Oxygen Therapy:  is not on home oxygen therapy.   Ventilator:    - No ventilator support    Rehab Therapies: Physical Therapy and Occupational Therapy  Weight Bearing Status/Restrictions: No weight bearing restirctions  Other Medical Equipment (for information only, NOT a DME order):  walker  Other Treatments: ***    Patient's personal belongings (please select all that are sent with patient):  {[facility] DME Belongings:433690069}    RN SIGNATURE:  Electronically signed by Arabella Pollock RN on 6/19/19 at 1:29 PM    CASE MANAGEMENT/SOCIAL WORK SECTION    Inpatient Status Date: ***    Readmission Risk Assessment Score:  Readmission Risk              Risk of Unplanned Readmission:        10           Discharging to Facility/ Agency   · Name: [facility]  · Address:  · Phone:[phone]  · Fax:    Dialysis Facility (if applicable)   · Name:  · Address:  · Dialysis Schedule:  · Phone:  · Fax:    / signature: Electronically signed by SLOANE Avila on 6/14/19 at 4:06 PM.Electronically signed by SLOANE Johnson on 6/18/2019 at 2:04 PM    PHYSICIAN SECTION    Prognosis: {Prognosis:3357029546}    Condition at Discharge: Isabel Armijo Patient Condition:[id number]}    Rehab Potential (if transferring to Rehab): {Prognosis:9385521418}    Recommended Labs or Other Treatments After Discharge: ***    Physician Certification: I certify the above information and transfer of Anthony Youngblood  is necessary for the continuing treatment of the diagnosis listed and that he requires {Admit to Appropriate Level of Care:03141} for {GREATER/LESS:127324336} 30 days.      Update Admission H&P: {CHP DME Changes in [facility]}    PHYSICIAN SIGNATURE:  Electronically signed by Remigio Ontiveros MD on 19 at 11:52 AM

## 2019-06-14 NOTE — H&P
Interval History and Physical    I have interviewed and examined the patient and reviewed the recent History and Physical.  There have been no changes to the recent H&P documentation. The patient understands the planned operation and its associated risks and benefits and agrees to proceed. The surgical consent form has been signed.     BP (!) 126/58   Pulse 66   Temp 98.3 °F (36.8 °C) (Temporal)   Resp 18   Ht 6' (1.829 m)   Wt 210 lb (95.3 kg)   SpO2 98%   BMI 28.48 kg/m²      Electronically signed by Sterling Sheikh MD on 6/14/2019 at 7:28 AM

## 2019-06-14 NOTE — CARE COORDINATION
LSW spoke with admissions at [facility] and faxed them some info to review on the pt. Pt requires a precert which will not be able to be started until Monday.

## 2019-06-14 NOTE — ANESTHESIA POSTPROCEDURE EVALUATION
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        Anesthesia Post Evaluation    Patient location during evaluation: PACU  Patient participation: complete - patient participated  Level of consciousness: awake  Pain score: 0  Airway patency: patent  Nausea & Vomiting: no nausea and no vomiting  Complications: no  Cardiovascular status: hemodynamically stable  Respiratory status: acceptable  Hydration status: euvolemic

## 2019-06-14 NOTE — PROGRESS NOTES
Blood draw by lab completed. Pt kuldip well. Very Cherokee and maribel heaing aids put in as well as dentures. Pt states no pain.

## 2019-06-14 NOTE — ANESTHESIA PRE PROCEDURE
Department of Anesthesiology  Preprocedure Note       Name:  Charmayne Jakes   Age:  80 y.o.  :  1934                                          MRN:  [id number]         Date:  2019      Surgeon: Tia Maynard):  Chris Rolle MD    Procedure: SPINE, L3-4 L5-S1 LAMINECTOMY L5-S1 INSTRUMENTED POSTERIOR INTERBODY FUSION AND POSTERIOR LATERAL FUSION **INR AM OF SURGERY** PRONE, AXIS SPINE TABLE, ACTIFUSE, NEW MICROSCOPE, NEW C-ARM X2, PEDIGUARD, SPINAL CORD MONITORING LUMBAR BRACE, GLOBUS, MIS TLIF , PAT AT PCP (N/A )    Medications prior to admission:   Prior to Admission medications    Not on File       Current medications:    No current facility-administered medications for this encounter. No current outpatient medications on file. Allergies: Allergies not on file    Problem List:  There is no problem list on file for this patient. Past Medical History:  No past medical history on file. Past Surgical History:  No past surgical history on file. Social History:    Social History     Tobacco Use    Smoking status: Not on file   Substance Use Topics    Alcohol use: Not on file                                Counseling given: Not Answered      Vital Signs (Current): There were no vitals filed for this visit. BP Readings from Last 3 Encounters:   No data found for BP       NPO Status:                                                                                 BMI:   Wt Readings from Last 3 Encounters:   No data found for Wt     There is no height or weight on file to calculate BMI.    CBC: No results found for: WBC, RBC, HGB, HCT, MCV, RDW, PLT    CMP: No results found for: NA, K, CL, CO2, BUN, CREATININE, GFRAA, AGRATIO, LABGLOM, GLUCOSE, PROT, CALCIUM, BILITOT, ALKPHOS, AST, ALT    POC Tests: No results for input(s): POCGLU, POCNA, POCK, POCCL, POCBUN, POCHEMO, POCHCT in the last 72 hours.     Coags: No results found for: PROTIME, INR, APTT    HCG (If Applicable): No results found for: PREGTESTUR, PREGSERUM, HCG, HCGQUANT     ABGs: No results found for: PHART, PO2ART, KYX2DUQ, SJU8HWX, BEART, E6UVVBXG     Type & Screen (If Applicable):  No results found for: [facility]    Anesthesia Evaluation  Patient summary reviewed and Nursing notes reviewed no history of anesthetic complications:   Airway: Mallampati: II  TM distance: >3 FB   Neck ROM: full  Mouth opening: > = 3 FB Dental: normal exam   (+) lower dentures and upper dentures      Pulmonary:                             ROS comment: H/O R lung r/s d/t ca   Cardiovascular:    (+) hypertension:, past MI: > 6 months, CAD:, CABG/stent:, dysrhythmias: atrial fibrillation,       ECG reviewed               Beta Blocker:  Dose within 24 Hrs         Neuro/Psych:   Negative Neuro/Psych ROS              GI/Hepatic/Renal: Neg GI/Hepatic/Renal ROS            Endo/Other: Negative Endo/Other ROS                    Abdominal:           Vascular: negative vascular ROS. Anesthesia Plan      general     ASA 3       Induction: intravenous. MIPS: Postoperative opioids intended and Prophylactic antiemetics administered. Anesthetic plan and risks discussed with patient. Plan discussed with CRNA.     Attending anesthesiologist reviewed and agrees with Bin Caal MD   6/13/2019

## 2019-06-14 NOTE — OP NOTE
Preoperative diagnosis: L3-4, L5-S1 stenosis. L5-S1 spondylolisthesis. Lumbar spondylosis. Postop diagnosis: Same    Procedure: 1) L3-4, L5-S1 laminectomy decompressing the L3, 4, 5, and S1 vertebral segments 2)  L 5-S1 posterior lumbar interbody fusion  3) L 5-S1 posterior lateral fusion 4) L 5-S1 instrumentation 5) L 5-S1 interbody cage  6) use of operative microscope                 Surgeon: Lolis Nevarez M.D. Asst.: Josefa BUCKNER The physician assistant was present through the entire case. Given the nature of the disease process and the procedure to be performed a skilled surgical assistant was necessary during the case. The assistant was necessary in order to hold retractors and directly assist in the operation. A certified scrub tech was at the back table managing instruments and supplies for the surgical case. Anesthesia: General    HPI: The patient had pain from the above-described condition. The patient failed all nonoperative treatment. All of the risks, benefits, and potential complications for operative and nonoperative treatment were discussed at length with the patient. Risks of operative intervention include, but are not limited to: Anesthesia complications, excessive blood loss, infection, damaged to uninjured structures including, but not limited to, the dural sac and nerve roots, blood clots, and lack of improvement or worsening of symptoms. These complications could result in death, permanent disability, or need for reoperation. The patient completely understood those risks and wished to proceed with operative intervention. Operative implants: Globus revolve pedicle screws, Globus rise titanium cage size 7 mm high 22 mm long 10 mm wide, Actifuse gun bone graft. Operative procedure: The patient was wheeled from the preanesthesia care unit to the theater. The patient was placed in supine position and all bony prominences were well-padded.  For the entire procedure anesthesia maintainined control of the patient's head neck. General anesthesia was induced. The patient was then placed prone on the Michael table. All bony prominences were well-padded. X-rays were then taken to confirm appropriate visualization of the operative level in AP and lateral projections. Kaia Chanel fluoroscopic imaging the cephalad and caudal pedicles were marked on the patient's skin. The incisions were then marked. Next, the back was prepped and draped in normal sterile fashion. Timeout was performed, site verification was verified, and appropriate antibiotic administration was confirmed. A longitudinal incision was then performed off to the left side of the marke pedicles. Dissection was carried down through the skin with a knife and Bovie was used to dissect down  the fat and the fascia. Blunt dissection was taken down to the transverse processes and facet on the left side. Next, the identical procedure was performed on the right side. Using AP and lateral fluoroscopic imaging and Pediguard, an and intrapedicular approach was performed. Using fluoroscopic imaging to to ensure that there was no breech of the pedicle, the trocar was brought down just through the posterior vertebral body wall of the right cephalad pedicle. This was done by starting at the 3 o'clock position. The guidewire was docked into the vertebral body. Next the identical procedure was performed on the left  starting at the 9 o'clock position. In the identical procedure was then performed bilaterally at the caudal level for both pedicles. The wires were covered with suction tubing and appropriately stored. Appropriate pedicle guard readings were obtained for all 4 wires. Using lateral x-ray, dilators were placed on the patient's left side over the operative level facet and lamina. Dilators were dilated up to a size 22 and a permanent 26 mm tube was docked into appropriate position visualized on AP and lateral projections.  The microscope was then brought in for use. Bovie was used to remove a small amount of fat and muscle overlying the facet and lamina. Thermal ablation was performed of the medial nerve branch supplying the facet at this level. Bovie was used and direct visualization of the normal anatomic area the nerve was performed. A bur was then used to debulk the lamina as well as the facet. A curved curet was used to enter under the lamina. 2 and 3 Kerrisons were used to perform a laminectomy taking off the ligamentum flavum and decompressing the patient's lateral shoulder of the dural sac, the exiting nerve root, and the traversing nerve root. Next wondering the tube sequentially across midline a laminectomy was performed completely decompressing centrally as well as the contralateral exiting and traversing nerve roots. At this point there was complete decompression of the exiting and traversing nerve roots bilaterally as well as centrally. The dural sac and nerve roots were pulsating freely    3 Kerrisons and a bur was used to take down the facet completely and laterally to see the exiting nerve root and its far lateral region. A nerve root retractor was used to gently retract and protect the dural sac and nerve roots. This nicely exposed the disc. Bipolar cautery and FloSeal was used to maintain hemostasis. The disc was box cut and the annulus was removed with the pituitary rongeur. Pituitaries were then used to remove the nucleus inside the disc. Rotating kelly were then used. Curved curettes were used to the right into the left to remove disc off of the endplate all the way to the contralateral side and down to the annulus anteriorly. Upbiters and backbiters were used to remove disc as well. At this point there was complete disc removal down to the endplates and the annulus. The disc was irrigated copiously and suctioned free of all debris.     The Actifuse gun was then placed the disc space the disc space was entire construct confirmed appropriate positioning of the screws, rods and cage. Next, the Actifuse gun was used to place bone graft in the lateral gutter from the L5 transverse process to the sacrum. This bone graft completely filled the posterior lateral gutter to provide a nice fusion from transverse process to transverse process along the lateral portion of the facets. The fascia for both incisions was closed with a running 0 Vicryl suture. The fatty layer was closed with 0 Vicryl the skin was closed with 2-0 Vicryl, 4-0 Monocryl, and glue. A sterile dressing was applied. At the conclusion of the case the patient's toes were pink and warm with brisk capillary refill. The patient tolerated the procedure well. There were no EMG changes for the entire case. Appropriate spinal cord monitor readings were obtained for all pedicle screws. Blood loss  100 mL      This dictation was created using voice recognition software. If there are any questions about inaccuracies please do not hesitate to contact me.

## 2019-06-14 NOTE — PROGRESS NOTES
Patient has chronic indwelling call catheter with leg bag. Leg bag changed to standard call catheter bag intraoperatively.

## 2019-06-14 NOTE — FLOWSHEET NOTE
Patient arrived to [facility], Bed 226. Oriented to call light system and surroundings. Patient c/o severe back pain rated 10/10. Informed him I will release his orders and medicate as ordered. Patient verbalizes no further needs at this time. Family just arrived to bedside, will begin admission and continue to monitor.

## 2019-06-15 LAB
ALBUMIN SERPL-MCNC: 3.4 G/DL (ref 3.5–4.6)
ANION GAP SERPL CALCULATED.3IONS-SCNC: 9 MEQ/L (ref 9–15)
BUN BLDV-MCNC: 19 MG/DL (ref 8–23)
CALCIUM SERPL-MCNC: 9.2 MG/DL (ref 8.5–9.9)
CHLORIDE BLD-SCNC: 104 MEQ/L (ref 95–107)
CO2: 26 MEQ/L (ref 20–31)
CREAT SERPL-MCNC: 0.82 MG/DL (ref 0.7–1.2)
GFR AFRICAN AMERICAN: >60
GFR NON-AFRICAN AMERICAN: >60
GLUCOSE BLD-MCNC: 174 MG/DL (ref 70–99)
HCT VFR BLD CALC: 33.6 % (ref 42–52)
HEMOGLOBIN: 11.6 G/DL (ref 14–18)
INR BLD: 1.3
MAGNESIUM: 2.1 MG/DL (ref 1.7–2.4)
MCH RBC QN AUTO: 35.1 PG (ref 27–31.3)
MCHC RBC AUTO-ENTMCNC: 34.4 % (ref 33–37)
MCV RBC AUTO: 101.9 FL (ref 80–100)
PDW BLD-RTO: 14.5 % (ref 11.5–14.5)
PHOSPHORUS: 3.1 MG/DL (ref 2.3–4.8)
PLATELET # BLD: 142 K/UL (ref 130–400)
POTASSIUM SERPL-SCNC: 5.6 MEQ/L (ref 3.4–4.9)
PROTHROMBIN TIME: 12.6 SEC (ref 9–11.5)
RBC # BLD: 3.3 M/UL (ref 4.7–6.1)
SODIUM BLD-SCNC: 139 MEQ/L (ref 135–144)
WBC # BLD: 8.7 K/UL (ref 4.8–10.8)

## 2019-06-15 PROCEDURE — 97162 PT EVAL MOD COMPLEX 30 MIN: CPT

## 2019-06-15 PROCEDURE — 6370000000 HC RX 637 (ALT 250 FOR IP): Performed by: ORTHOPAEDIC SURGERY

## 2019-06-15 PROCEDURE — 6370000000 HC RX 637 (ALT 250 FOR IP): Performed by: NURSE PRACTITIONER

## 2019-06-15 PROCEDURE — 85027 COMPLETE CBC AUTOMATED: CPT

## 2019-06-15 PROCEDURE — 6370000000 HC RX 637 (ALT 250 FOR IP): Performed by: INTERNAL MEDICINE

## 2019-06-15 PROCEDURE — 36415 COLL VENOUS BLD VENIPUNCTURE: CPT

## 2019-06-15 PROCEDURE — 2580000003 HC RX 258: Performed by: ORTHOPAEDIC SURGERY

## 2019-06-15 PROCEDURE — 85610 PROTHROMBIN TIME: CPT

## 2019-06-15 PROCEDURE — 80069 RENAL FUNCTION PANEL: CPT

## 2019-06-15 PROCEDURE — 1210000000 HC MED SURG R&B

## 2019-06-15 PROCEDURE — 83735 ASSAY OF MAGNESIUM: CPT

## 2019-06-15 PROCEDURE — 97535 SELF CARE MNGMENT TRAINING: CPT

## 2019-06-15 RX ORDER — SODIUM POLYSTYRENE SULFONATE 15 G/60ML
15 SUSPENSION ORAL; RECTAL ONCE
Status: COMPLETED | OUTPATIENT
Start: 2019-06-15 | End: 2019-06-15

## 2019-06-15 RX ADMIN — DOCUSATE SODIUM 100 MG: 100 CAPSULE, LIQUID FILLED ORAL at 21:09

## 2019-06-15 RX ADMIN — AMIODARONE HYDROCHLORIDE 100 MG: 200 TABLET ORAL at 09:35

## 2019-06-15 RX ADMIN — ALLOPURINOL 100 MG: 100 TABLET ORAL at 09:35

## 2019-06-15 RX ADMIN — GABAPENTIN 300 MG: 300 CAPSULE ORAL at 09:35

## 2019-06-15 RX ADMIN — DOCUSATE SODIUM 100 MG: 100 CAPSULE, LIQUID FILLED ORAL at 09:35

## 2019-06-15 RX ADMIN — GABAPENTIN 300 MG: 300 CAPSULE ORAL at 21:09

## 2019-06-15 RX ADMIN — CARVEDILOL 6.25 MG: 6.25 TABLET, FILM COATED ORAL at 09:36

## 2019-06-15 RX ADMIN — SODIUM POLYSTYRENE SULFONATE 15 G: 15 SUSPENSION ORAL; RECTAL at 13:25

## 2019-06-15 RX ADMIN — Medication 10 ML: at 21:10

## 2019-06-15 RX ADMIN — LISINOPRIL 10 MG: 10 TABLET ORAL at 09:35

## 2019-06-15 RX ADMIN — ATORVASTATIN CALCIUM 10 MG: 10 TABLET, FILM COATED ORAL at 21:09

## 2019-06-15 RX ADMIN — TAMSULOSIN HYDROCHLORIDE 0.4 MG: 0.4 CAPSULE ORAL at 21:09

## 2019-06-15 RX ADMIN — SODIUM CHLORIDE: 9 INJECTION, SOLUTION INTRAVENOUS at 03:31

## 2019-06-15 RX ADMIN — WARFARIN SODIUM 7.5 MG: 2.5 TABLET ORAL at 17:50

## 2019-06-15 ASSESSMENT — PAIN SCALES - GENERAL
PAINLEVEL_OUTOF10: 0

## 2019-06-15 NOTE — FLOWSHEET NOTE
2130 - assessment complete. Pt is A & O x4, lungs diminished with no s/s of SOB. Pt moving all four extremities and has no complaint of pain at this time. Dressing to back is clean, dry and intact. Pt is reading newspaper at this time with call light within reach. 2345 - medicated patient with 1 tab percocet for 3/10 pain to back. Call light within reach will reassess.

## 2019-06-15 NOTE — CARE COORDINATION
PATIENT PLAN FOR DISCHARGE IS TO GO TO [facility]. PRECERT NEEDS TO BE STARTED ON Monday. INFO WAS FAXED. LSW/C3 TO F/U Monday TO INITIATE PRECERT.

## 2019-06-15 NOTE — PROGRESS NOTES
Clinical Pharmacy Note    Nacho Skinner is a 80 y.o. male for whom pharmacy has been asked to manage warfarin therapy. Reason for Admission: s/p lumbar fusion    Consulting Physician: Roula Davalos MD   Warfarin dose prior to admission: MW 2.5 mg, 5 mg all other days  Warfarin Indication: A Fib  Target INR range: 2-3  Order for concomitant anticoagulant therapy: n/a    Outpatient warfarin provider: Dr. Anneliese Dyson)    No past medical history on file. Recent Labs     06/15/19  0532   INR 1.3     Recent Labs     06/15/19  0532   HGB 11.6*   HCT 33.6*          Current warfarin drug-drug interactions: amiodarone    Date INR Warfarin Dose   6/15/19 1.3 7.5                                     INR today is subtherapeutic for goal 2-3. Patient had been holding warfarin for orthopedic surgery. Will boost with 7.5 mg today. Patient normally takes 30 mg TWD. Daily PT/INR until stable within therapeutic range. Thank you for the consult.

## 2019-06-15 NOTE — PROGRESS NOTES
Physical Therapy Med Surg Initial Assessment  Facility/Department: [facility] NEURO  Room: N226/N226-01       NAME: Rose Mary Marx  : 1934 (80 y.o.)  MRN: [id number]  CODE STATUS: Full Code    Date of Service: 6/15/2019    Patient Diagnosis(es): Lumbar stenosis with neurogenic claudication [M48.062]   No chief complaint on file. Patient Active Problem List    Diagnosis Date Noted    Lumbar stenosis with neurogenic claudication 2019        No past medical history on file. No past surgical history on file. Chart Reviewed: Yes  Family / Caregiver Present: Yes(son and DIL)  General Comment  Comments: Pt resting in bed - agreeable to PT evaluation    Restrictions:  Restrictions/Precautions: Fall Risk  Position Activity Restriction  Spinal Precautions: No Bending, No Lifting, No Twisting  Spinal Precautions: LSO on OOB and for ambulation     SUBJECTIVE: Subjective: \"Let's go. \"  Pre Treatment Pain Screening  Comments / Details: denies    Post Treatment Pain Screening:   Pain Assessment  Pain Assessment: (unchanged)    Prior Level of Function:  Social/Functional History  Lives With: Spouse  Type of Home: House  Home Layout: One level  Home Access: Level entry  Home Equipment: Rolling walker  ADL Assistance: Independent  Homemaking Assistance: Independent  Ambulation Assistance: Independent  Transfer Assistance: Independent    OBJECTIVE:   Vision/Hearing:  Vision: Within Functional Limits  Hearing: Exceptions to [facility]  Hearing Exceptions: Right hearing aid    Cognition:  Overall Orientation Status: Within Functional Limits  Follows Commands: Within Functional Limits    Observation/Palpation  Observation: No acute distress noted    ROM:  RLE PROM: WFL  LLE PROM: WFL    Strength:  Strength RLE  Strength RLE: WFL  Strength LLE  Strength LLE: WFL  Strength Other  Other: Pain limiting trunk and hip strength    Neuro:  Balance  Sitting - Static: Good  Sitting - Dynamic: Fair  Standing - Static: Fair  Standing - Dynamic: Education, & procurement, Safety Education & Training, Patient/Caregiver Education & Training, Stair training, Modalities, Gait Training, Neuromuscular Re-education, Endurance Training  Safety Devices  Type of devices:  All fall risk precautions in place    Goals:  Short term goals  Short term goal 1: Pt to complete HEP with indep  Long term goals  Long term goal 1: Pt to complete bed mobility with indep  Long term goal 2: Pt to complete all transfers with SBA  Long term goal 3: Pt to ambulate 50-150ft with LRD and SBA    [facility] (6 CLICK) BASIC MOBILITY  AM-PAC Inpatient Mobility Raw Score : 16     Therapy Time:   Individual   Time In 0940   Time Out 1005   Minutes 25   Timed Code Treatment Minutes: 8 Minutes(gait 3min; 5min transfers)       Johnathon Larry PT, 06/15/19 at 10:47 AM

## 2019-06-15 NOTE — PROGRESS NOTES
[address] Day: 2   S/p lumbar fusion    Patient doing fairly well  Vitals:    19 2345   BP: 116/69   Pulse: 87   Resp: 18   Temp: 97.7 °F (36.5 °C)   SpO2: 97%      Temp (24hrs), Av.7 °F (35.9 °C), Min:95.5 °F (35.3 °C), Max:97.9 °F (36.6 °C)       Pain Control Good  No chest pain or shortness of breath. No calf pain    Exam:   Dressing dry. Neuro status good. Extremity shows neuro vascular status intact. Flexion and extension intact on extremity. Calves soft and non-tender without evidence of DVT. Labs reviewed:  CBC:   Recent Labs     06/15/19  0532   WBC 8.7   HGB 11.6*        BMP:    Recent Labs     06/15/19  0532      K 5.6*      CO2 26   BUN 19   CREATININE 0.82   GLUCOSE 174*       I&O  I/O last 3 completed shifts: In: 4127.5 [P.O.:1160; I.V.:2917.5; IV Piggyback:50]  Out: 1175 [Urine:975; Blood:200]      Assessment:  Stable post op. Plan:  Planning on snf.   Continue current rx    Ying Mcduffie MD  6/15/2019 8:16 AM

## 2019-06-15 NOTE — PROGRESS NOTES
Hospitalist Progress Note      PCP: Soha Calero MD    Date of Admission: 6/14/2019    Chief Complaint:  No acute events overnight,afebrile,stable HD    Medications:  Reviewed    Infusion Medications    sodium chloride 125 mL/hr at 06/15/19 0331     Scheduled Medications    sodium polystyrene  15 g Oral Once    sodium chloride flush  10 mL Intravenous 2 times per day    docusate sodium  100 mg Oral BID    allopurinol  100 mg Oral Daily    amiodarone  100 mg Oral Daily    atorvastatin  10 mg Oral Daily    gabapentin  300 mg Oral BID    tamsulosin  0.4 mg Oral Daily    carvedilol  6.25 mg Oral BID WC    torsemide  20 mg Oral Daily     PRN Meds: sodium chloride flush, oxyCODONE-acetaminophen **OR** oxyCODONE-acetaminophen, ondansetron, morphine **OR** morphine, diazepam, nitroGLYCERIN      Intake/Output Summary (Last 24 hours) at 6/15/2019 1002  Last data filed at 6/15/2019 0634  Gross per 24 hour   Intake 3127.5 ml   Output 1175 ml   Net 1952.5 ml       Exam:    /69   Pulse 87   Temp 97.7 °F (36.5 °C) (Oral)   Resp 18   Ht 6' (1.829 m)   Wt 210 lb (95.3 kg)   SpO2 97%   BMI 28.48 kg/m²     General appearance: No apparent distress, appears stated age and cooperative. Respiratory:  Clear to auscultation, bilaterally without Rales/Wheezes/Rhonchi. Cardiovascular: Regular rate and rhythm with normal S1/S2. Abdomen: Soft, non-tender, non-distended with normal bowel sounds. Musculoskeletal: No edema bilaterally. Labs:   Recent Labs     06/15/19  0532   WBC 8.7   HGB 11.6*   HCT 33.6*        Recent Labs     06/15/19  0532      K 5.6*      CO2 26   BUN 19   CREATININE 0.82   CALCIUM 9.2   PHOS 3.1     No results for input(s): AST, ALT, BILIDIR, BILITOT, ALKPHOS in the last 72 hours. Recent Labs     06/14/19  0630 06/15/19  0532   INR 1.2 1.3     No results for input(s): Rice Josh in the last 72 hours.     Urinalysis:    No results found for: Melisa Cuellar Chapito Ingram, BLOODU, SPECGRAV, GLUCOSEU    Radiology:  [place] LESS THAN 1 HOUR   Final Result   INTRAOPERATIVE IMAGING ASSISTANCE FOR L5-S1 FUSION.                  Assessment/Plan:    80 y.o. male with history of CAD s/p PCI, HTN, HLD,Gout, hernia,  A.fib, BPH with indwelling urinary catheter,  lung cancer s/p right partial lobectomy  and lumbar stenosis who was admitted following elective L3-S1 laminectomy and fusion.      Lumbar stenosis with neurogenic claudication  - s/p elective L3-S1 laminectomy and fusion  - POD 1  - management per primary service    Hyperkalemia   - mild, hold lisinopril  - Kayexalate once  - continue torsemide  - monitor K level     A.fib  - continue coreg and and amiodarone,   - OK to resume coumadin per surgical service,pharmacy to dose  - monitor INR      CAD s/p PCI  - continue home meds      HTN  - hold Lisinopril due to hyperkalemia     BPH with chronic indwelling urinary catheter  - continue Flomax                       Electronically signed by Umair Cota MD on 6/15/2019 at 10:02 AM

## 2019-06-16 LAB
ALBUMIN SERPL-MCNC: 2.8 G/DL (ref 3.5–4.6)
ANION GAP SERPL CALCULATED.3IONS-SCNC: 8 MEQ/L (ref 9–15)
BUN BLDV-MCNC: 22 MG/DL (ref 8–23)
CALCIUM SERPL-MCNC: 8.6 MG/DL (ref 8.5–9.9)
CHLORIDE BLD-SCNC: 105 MEQ/L (ref 95–107)
CO2: 25 MEQ/L (ref 20–31)
CREAT SERPL-MCNC: 0.89 MG/DL (ref 0.7–1.2)
GFR AFRICAN AMERICAN: >60
GFR NON-AFRICAN AMERICAN: >60
GLUCOSE BLD-MCNC: 120 MG/DL (ref 70–99)
HCT VFR BLD CALC: 27.6 % (ref 42–52)
HEMOGLOBIN: 9.5 G/DL (ref 14–18)
INR BLD: 1.2
MAGNESIUM: 2 MG/DL (ref 1.7–2.4)
MCH RBC QN AUTO: 34.9 PG (ref 27–31.3)
MCHC RBC AUTO-ENTMCNC: 34.3 % (ref 33–37)
MCV RBC AUTO: 101.9 FL (ref 80–100)
PDW BLD-RTO: 14.8 % (ref 11.5–14.5)
PHOSPHORUS: 2.4 MG/DL (ref 2.3–4.8)
PLATELET # BLD: 111 K/UL (ref 130–400)
POTASSIUM SERPL-SCNC: 4.2 MEQ/L (ref 3.4–4.9)
PROTHROMBIN TIME: 12.4 SEC (ref 9–11.5)
RBC # BLD: 2.71 M/UL (ref 4.7–6.1)
SODIUM BLD-SCNC: 138 MEQ/L (ref 135–144)
WBC # BLD: 7.9 K/UL (ref 4.8–10.8)

## 2019-06-16 PROCEDURE — 83735 ASSAY OF MAGNESIUM: CPT

## 2019-06-16 PROCEDURE — 80069 RENAL FUNCTION PANEL: CPT

## 2019-06-16 PROCEDURE — 36415 COLL VENOUS BLD VENIPUNCTURE: CPT

## 2019-06-16 PROCEDURE — 6370000000 HC RX 637 (ALT 250 FOR IP): Performed by: INTERNAL MEDICINE

## 2019-06-16 PROCEDURE — 6370000000 HC RX 637 (ALT 250 FOR IP): Performed by: ORTHOPAEDIC SURGERY

## 2019-06-16 PROCEDURE — 1210000000 HC MED SURG R&B

## 2019-06-16 PROCEDURE — 85027 COMPLETE CBC AUTOMATED: CPT

## 2019-06-16 PROCEDURE — 6370000000 HC RX 637 (ALT 250 FOR IP): Performed by: NURSE PRACTITIONER

## 2019-06-16 PROCEDURE — 85610 PROTHROMBIN TIME: CPT

## 2019-06-16 PROCEDURE — 2580000003 HC RX 258: Performed by: ORTHOPAEDIC SURGERY

## 2019-06-16 RX ORDER — POLYETHYLENE GLYCOL 3350 17 G/17G
17 POWDER, FOR SOLUTION ORAL DAILY
Status: DISCONTINUED | OUTPATIENT
Start: 2019-06-16 | End: 2019-06-19 | Stop reason: HOSPADM

## 2019-06-16 RX ORDER — WARFARIN SODIUM 5 MG/1
10 TABLET ORAL ONCE
Status: COMPLETED | OUTPATIENT
Start: 2019-06-16 | End: 2019-06-16

## 2019-06-16 RX ADMIN — WARFARIN SODIUM 10 MG: 5 TABLET ORAL at 17:22

## 2019-06-16 RX ADMIN — OXYCODONE HYDROCHLORIDE AND ACETAMINOPHEN 1 TABLET: 5; 325 TABLET ORAL at 04:02

## 2019-06-16 RX ADMIN — DOCUSATE SODIUM 100 MG: 100 CAPSULE, LIQUID FILLED ORAL at 20:41

## 2019-06-16 RX ADMIN — MAGNESIUM OXIDE TAB 400 MG (241.3 MG ELEMENTAL MG) 400 MG: 400 (241.3 MG) TAB at 10:51

## 2019-06-16 RX ADMIN — ALLOPURINOL 100 MG: 100 TABLET ORAL at 08:25

## 2019-06-16 RX ADMIN — TAMSULOSIN HYDROCHLORIDE 0.4 MG: 0.4 CAPSULE ORAL at 08:26

## 2019-06-16 RX ADMIN — Medication 10 ML: at 20:42

## 2019-06-16 RX ADMIN — CARVEDILOL 6.25 MG: 6.25 TABLET, FILM COATED ORAL at 08:25

## 2019-06-16 RX ADMIN — ATORVASTATIN CALCIUM 10 MG: 10 TABLET, FILM COATED ORAL at 20:41

## 2019-06-16 RX ADMIN — DOCUSATE SODIUM 100 MG: 100 CAPSULE, LIQUID FILLED ORAL at 08:26

## 2019-06-16 RX ADMIN — AMIODARONE HYDROCHLORIDE 100 MG: 200 TABLET ORAL at 08:25

## 2019-06-16 RX ADMIN — OXYCODONE HYDROCHLORIDE AND ACETAMINOPHEN 2 TABLET: 5; 325 TABLET ORAL at 20:41

## 2019-06-16 RX ADMIN — Medication 10 ML: at 08:28

## 2019-06-16 RX ADMIN — TORSEMIDE 20 MG: 20 TABLET ORAL at 08:24

## 2019-06-16 RX ADMIN — GABAPENTIN 300 MG: 300 CAPSULE ORAL at 08:24

## 2019-06-16 RX ADMIN — POLYETHYLENE GLYCOL 3350 17 G: 17 POWDER, FOR SOLUTION ORAL at 10:18

## 2019-06-16 RX ADMIN — OXYCODONE HYDROCHLORIDE AND ACETAMINOPHEN 1 TABLET: 5; 325 TABLET ORAL at 08:25

## 2019-06-16 RX ADMIN — MAGNESIUM OXIDE TAB 400 MG (241.3 MG ELEMENTAL MG) 400 MG: 400 (241.3 MG) TAB at 20:42

## 2019-06-16 RX ADMIN — GABAPENTIN 300 MG: 300 CAPSULE ORAL at 20:41

## 2019-06-16 ASSESSMENT — PAIN DESCRIPTION - LOCATION: LOCATION: BACK

## 2019-06-16 ASSESSMENT — PAIN DESCRIPTION - ONSET
ONSET: GRADUAL
ONSET: ON-GOING

## 2019-06-16 ASSESSMENT — PAIN SCALES - GENERAL: PAINLEVEL_OUTOF10: 7

## 2019-06-16 ASSESSMENT — PAIN DESCRIPTION - ORIENTATION: ORIENTATION: MID;LOWER

## 2019-06-16 NOTE — PROGRESS NOTES
Clinical Pharmacy Note    Warfarin consult follow-up    Recent Labs     06/16/19  0603   INR 1.2     Recent Labs     06/15/19  0532 06/16/19  0603   HGB 11.6* 9.5*   HCT 33.6* 27.6*    111*       Significant drug:drug interactions:  Patient continues on amiodarone, gabapentin, and atorvastatin    Date INR Warfarin Dose   06/15/19 1.3 7.5   06/16/19 1.2  10 mg                                              Notes:  INR of 1.2 is sub therapeutic for goal 2-3. Will boost patient with 10 mg of warfarin today. Daily PT/INR until stable within therapeutic range.

## 2019-06-16 NOTE — FLOWSHEET NOTE
2107- Vital signs obtained, stable. Assessment complete and charted. Patient resting in bed. Denies any complaints of pain, shortness of breath, or nausea. 2109- Gave patient evening medications. 2353- Obtained vital signs. Reassessment of neurovascular status charted. Patient wanted to be repositioned in bed, assisted him onto his right side at this time. 2449- Patient ambulated to the bathroom with walker and stand by assistance. Patient c/o 5/10 pain in his back. Medicated with 1 percocet per MAR.     6516- obtained vital signs. Neurovascular status unchanged at this time. Repositioned patient onto his left side with pillow support.

## 2019-06-16 NOTE — FLOWSHEET NOTE
Assessment complete. Lungs clear but diminished. Complains of pain 6/10 in back medicated with percocet as ordered. AXOX4. Dressing to back clean dry and intact. Neurovascular checks are within limits. Call light within reach. Bed alarm on.

## 2019-06-16 NOTE — FLOWSHEET NOTE
Assisted patient up to the chair. At first patient requested a pain pill. When I to give it to him he then decided he did not have pain and did not want it at this time. Will continue to assess.

## 2019-06-16 NOTE — PROGRESS NOTES
[facility] Day: 2    Patient OK  Vitals:    19 0427   BP: (!) 123/56   Pulse: 72   Resp: 16   Temp: 97.9 °F (36.6 °C)   SpO2: 99%      Temp (24hrs), Av.8 °F (36.6 °C), Min:97.5 °F (36.4 °C), Max:98.1 °F (36.7 °C)       Pain Control Good  No chest pain or shortness of breath. No calf pain    Exam:   Dressing clean and dry  Extremity shows neuro vascular status intact. Flexion and extension intact on extremity. Calves soft and non-tender without evidence of DVT. Labs reviewed:  CBC:   Recent Labs     06/15/19  0532 19  0603   WBC 8.7 7.9   HGB 11.6* 9.5*    111*     BMP:    Recent Labs     06/15/19  0532 19  0603    138   K 5.6* 4.2    105   CO2 26 25   BUN 19 22   CREATININE 0.82 0.89   GLUCOSE 174* 120*       I&O  I/O last 3 completed shifts: In: 10 [I.V.:10]  Out: -       Assessment:  S/p lumbar fusion    Plan:  No BM yet.   Getting colace  Continue rx    Sarah Shields MD  2019 7:58 AM

## 2019-06-16 NOTE — PROGRESS NOTES
Hospitalist Progress Note      PCP: Esther Robert MD    Date of Admission: 6/14/2019    Chief Complaint:  No acute events overnight,afebrile,stable HD    Medications:  Reviewed    Infusion Medications    sodium chloride Stopped (06/15/19 1100)     Scheduled Medications    warfarin (COUMADIN) daily dosing (placeholder)   Other RX Placeholder    sodium chloride flush  10 mL Intravenous 2 times per day    docusate sodium  100 mg Oral BID    allopurinol  100 mg Oral Daily    amiodarone  100 mg Oral Daily    atorvastatin  10 mg Oral Daily    gabapentin  300 mg Oral BID    tamsulosin  0.4 mg Oral Daily    carvedilol  6.25 mg Oral BID WC    torsemide  20 mg Oral Daily     PRN Meds: sodium chloride flush, oxyCODONE-acetaminophen **OR** oxyCODONE-acetaminophen, ondansetron, morphine **OR** morphine, diazepam, nitroGLYCERIN      Intake/Output Summary (Last 24 hours) at 6/16/2019 0932  Last data filed at 6/15/2019 2110  Gross per 24 hour   Intake 10 ml   Output --   Net 10 ml       Exam:    BP (!) 123/59   Pulse 76   Temp 97.9 °F (36.6 °C) (Oral)   Resp 18   Ht 6' (1.829 m)   Wt 210 lb (95.3 kg)   SpO2 94%   BMI 28.48 kg/m²     General appearance: No apparent distress, appears stated age and cooperative. Respiratory:  Clear to auscultation, bilaterally without Rales/Wheezes/Rhonchi. Cardiovascular: Regular rate and rhythm with normal S1/S2. Abdomen: Soft, non-tender, non-distended with normal bowel sounds. Musculoskeletal: No edema bilaterally. Labs:   Recent Labs     06/15/19  0532 06/16/19  0603   WBC 8.7 7.9   HGB 11.6* 9.5*   HCT 33.6* 27.6*    111*     Recent Labs     06/15/19  0532 06/16/19  0603    138   K 5.6* 4.2    105   CO2 26 25   BUN 19 22   CREATININE 0.82 0.89   CALCIUM 9.2 8.6   PHOS 3.1 2.4     No results for input(s): AST, ALT, BILIDIR, BILITOT, ALKPHOS in the last 72 hours.   Recent Labs     06/14/19  0630 06/15/19  0532 06/16/19  0603   INR 1.2 1.3 1.2     No results for input(s): Bryon Brown in the last 72 hours. Urinalysis:    No results found for: Stephanie Floodg, BACTERIA, RBCUA, BLOODU, SPECGRAV, Jessica [place] 994    Radiology:  [place] LESS THAN 1 HOUR   Final Result   INTRAOPERATIVE IMAGING ASSISTANCE FOR L5-S1 FUSION.                  Assessment/Plan:    80 y.o. male with history of CAD s/p PCI, HTN, HLD,Gout, hernia,  A.fib, BPH with indwelling urinary catheter,  lung cancer s/p right partial lobectomy  and lumbar stenosis who was admitted following elective L3-S1 laminectomy and fusion.      Lumbar stenosis with neurogenic claudication  - s/p elective L3-S1 laminectomy and fusion  - POD 2  - management per primary service    Acute blood loss anemia  - likely due to perioperative blood loss  - monitor H/H    Hyperkalemia   - resolved    Thrombocytopenia   - mild,monitor     A.fib  - continue coreg and and amiodarone,   - OK to resume coumadin per surgical service,pharmacy to dose  - monitor INR      CAD s/p PCI  - continue home meds      HTN  - holding Lisinopril due to hyperkalemia     BPH with chronic indwelling urinary catheter  - continue Flomax            Electronically signed by Alveta Phalen, MD on 6/16/2019 at 9:32 AM

## 2019-06-17 LAB
ALBUMIN SERPL-MCNC: 2.7 G/DL (ref 3.5–4.6)
ANION GAP SERPL CALCULATED.3IONS-SCNC: 10 MEQ/L (ref 9–15)
BUN BLDV-MCNC: 23 MG/DL (ref 8–23)
CALCIUM SERPL-MCNC: 8.6 MG/DL (ref 8.5–9.9)
CHLORIDE BLD-SCNC: 102 MEQ/L (ref 95–107)
CO2: 26 MEQ/L (ref 20–31)
CREAT SERPL-MCNC: 0.76 MG/DL (ref 0.7–1.2)
GFR AFRICAN AMERICAN: >60
GFR NON-AFRICAN AMERICAN: >60
GLUCOSE BLD-MCNC: 128 MG/DL (ref 70–99)
HCT VFR BLD CALC: 28.9 % (ref 42–52)
HEMOGLOBIN: 10.1 G/DL (ref 14–18)
INR BLD: 1.2
MAGNESIUM: 1.8 MG/DL (ref 1.7–2.4)
MCH RBC QN AUTO: 35 PG (ref 27–31.3)
MCHC RBC AUTO-ENTMCNC: 34.8 % (ref 33–37)
MCV RBC AUTO: 100.4 FL (ref 80–100)
PDW BLD-RTO: 14.6 % (ref 11.5–14.5)
PHOSPHORUS: 2.6 MG/DL (ref 2.3–4.8)
PLATELET # BLD: 131 K/UL (ref 130–400)
POTASSIUM SERPL-SCNC: 4.2 MEQ/L (ref 3.4–4.9)
PROTHROMBIN TIME: 12 SEC (ref 9–11.5)
RBC # BLD: 2.88 M/UL (ref 4.7–6.1)
REASON FOR REJECTION: NORMAL
REJECTED TEST: NORMAL
SODIUM BLD-SCNC: 138 MEQ/L (ref 135–144)
WBC # BLD: 8.8 K/UL (ref 4.8–10.8)

## 2019-06-17 PROCEDURE — 85610 PROTHROMBIN TIME: CPT

## 2019-06-17 PROCEDURE — 83735 ASSAY OF MAGNESIUM: CPT

## 2019-06-17 PROCEDURE — 6370000000 HC RX 637 (ALT 250 FOR IP): Performed by: INTERNAL MEDICINE

## 2019-06-17 PROCEDURE — 80069 RENAL FUNCTION PANEL: CPT

## 2019-06-17 PROCEDURE — 97116 GAIT TRAINING THERAPY: CPT

## 2019-06-17 PROCEDURE — 85027 COMPLETE CBC AUTOMATED: CPT

## 2019-06-17 PROCEDURE — 6370000000 HC RX 637 (ALT 250 FOR IP): Performed by: ORTHOPAEDIC SURGERY

## 2019-06-17 PROCEDURE — 6370000000 HC RX 637 (ALT 250 FOR IP): Performed by: PHYSICIAN ASSISTANT

## 2019-06-17 PROCEDURE — 97535 SELF CARE MNGMENT TRAINING: CPT

## 2019-06-17 PROCEDURE — 1210000000 HC MED SURG R&B

## 2019-06-17 PROCEDURE — 2580000003 HC RX 258: Performed by: ORTHOPAEDIC SURGERY

## 2019-06-17 PROCEDURE — 6370000000 HC RX 637 (ALT 250 FOR IP): Performed by: NURSE PRACTITIONER

## 2019-06-17 PROCEDURE — 36415 COLL VENOUS BLD VENIPUNCTURE: CPT

## 2019-06-17 RX ORDER — BISACODYL 10 MG
10 SUPPOSITORY, RECTAL RECTAL DAILY PRN
Status: DISCONTINUED | OUTPATIENT
Start: 2019-06-17 | End: 2019-06-19 | Stop reason: HOSPADM

## 2019-06-17 RX ORDER — MAGNESIUM OXIDE 400 MG/1
400 TABLET ORAL 2 TIMES DAILY
Status: ON HOLD | COMMUNITY
End: 2019-06-19 | Stop reason: HOSPADM

## 2019-06-17 RX ORDER — WARFARIN SODIUM 5 MG/1
10 TABLET ORAL ONCE
Status: COMPLETED | OUTPATIENT
Start: 2019-06-17 | End: 2019-06-17

## 2019-06-17 RX ADMIN — MAGNESIUM CITRATE 296 ML: 1.75 LIQUID ORAL at 15:25

## 2019-06-17 RX ADMIN — GABAPENTIN 300 MG: 300 CAPSULE ORAL at 21:32

## 2019-06-17 RX ADMIN — CARVEDILOL 6.25 MG: 6.25 TABLET, FILM COATED ORAL at 09:26

## 2019-06-17 RX ADMIN — TAMSULOSIN HYDROCHLORIDE 0.4 MG: 0.4 CAPSULE ORAL at 09:23

## 2019-06-17 RX ADMIN — WARFARIN SODIUM 10 MG: 5 TABLET ORAL at 15:24

## 2019-06-17 RX ADMIN — OXYCODONE HYDROCHLORIDE AND ACETAMINOPHEN 1 TABLET: 5; 325 TABLET ORAL at 21:32

## 2019-06-17 RX ADMIN — ALLOPURINOL 100 MG: 100 TABLET ORAL at 09:23

## 2019-06-17 RX ADMIN — MAGNESIUM OXIDE TAB 400 MG (241.3 MG ELEMENTAL MG) 400 MG: 400 (241.3 MG) TAB at 09:23

## 2019-06-17 RX ADMIN — BISACODYL 10 MG: 10 SUPPOSITORY RECTAL at 21:33

## 2019-06-17 RX ADMIN — AMIODARONE HYDROCHLORIDE 100 MG: 200 TABLET ORAL at 09:23

## 2019-06-17 RX ADMIN — MAGNESIUM OXIDE TAB 400 MG (241.3 MG ELEMENTAL MG) 400 MG: 400 (241.3 MG) TAB at 21:32

## 2019-06-17 RX ADMIN — TORSEMIDE 20 MG: 20 TABLET ORAL at 09:23

## 2019-06-17 RX ADMIN — Medication 10 ML: at 09:24

## 2019-06-17 RX ADMIN — ATORVASTATIN CALCIUM 10 MG: 10 TABLET, FILM COATED ORAL at 21:37

## 2019-06-17 RX ADMIN — DOCUSATE SODIUM 100 MG: 100 CAPSULE, LIQUID FILLED ORAL at 09:23

## 2019-06-17 RX ADMIN — Medication 10 ML: at 21:33

## 2019-06-17 RX ADMIN — GABAPENTIN 300 MG: 300 CAPSULE ORAL at 09:23

## 2019-06-17 RX ADMIN — DOCUSATE SODIUM 100 MG: 100 CAPSULE, LIQUID FILLED ORAL at 21:32

## 2019-06-17 RX ADMIN — CARVEDILOL 6.25 MG: 6.25 TABLET, FILM COATED ORAL at 17:29

## 2019-06-17 RX ADMIN — POLYETHYLENE GLYCOL 3350 17 G: 17 POWDER, FOR SOLUTION ORAL at 09:23

## 2019-06-17 ASSESSMENT — PAIN DESCRIPTION - ORIENTATION: ORIENTATION: MID;LOWER

## 2019-06-17 ASSESSMENT — PAIN DESCRIPTION - FREQUENCY: FREQUENCY: INTERMITTENT

## 2019-06-17 ASSESSMENT — PAIN DESCRIPTION - PAIN TYPE: TYPE: SURGICAL PAIN

## 2019-06-17 ASSESSMENT — PAIN DESCRIPTION - DESCRIPTORS: DESCRIPTORS: ACHING;SORE

## 2019-06-17 ASSESSMENT — PAIN DESCRIPTION - LOCATION: LOCATION: BACK

## 2019-06-17 ASSESSMENT — PAIN SCALES - GENERAL: PAINLEVEL_OUTOF10: 5

## 2019-06-17 NOTE — PROGRESS NOTES
INPATIENT PROGRESS NOTE    SERVICE DATE:  6/17/2019   SERVICE TIME:  11:43 AM      SUBJECTIVE    INTERVAL HPI: 80year old male who feels good today. Awaiting precertification for [facility] in [place]. States that he wants to have therapy in [place].  Denies cp sob ha rash anx n v d f     MEDICATIONS:    Current Facility-Administered Medications   Medication Dose Route Frequency Provider Last Rate Last Dose    polyethylene glycol (GLYCOLAX) packet 17 g  17 g Oral Daily Juancho Amaya MD   17 g at 06/17/19 0923    magnesium oxide (MAG-OX) tablet 400 mg  400 mg Oral BID Juancho Amaya MD   400 mg at 06/17/19 7860    warfarin (COUMADIN) daily dosing (placeholder)   Other RX Placeholder Juancho Amaya MD   Stopped at 06/15/19 1208    0.9 % sodium chloride infusion   Intravenous Continuous Franco Mills MD   Stopped at 06/15/19 1100    sodium chloride flush 0.9 % injection 10 mL  10 mL Intravenous 2 times per day Franco Mills MD   10 mL at 06/17/19 0924    sodium chloride flush 0.9 % injection 10 mL  10 mL Intravenous PRN Franco Mills MD        oxyCODONE-acetaminophen (PERCOCET) 5-325 MG per tablet 1 tablet  1 tablet Oral Q4H PRN Franco Mills MD   1 tablet at 06/16/19 0825    Or    oxyCODONE-acetaminophen (PERCOCET) 5-325 MG per tablet 2 tablet  2 tablet Oral Q4H PRN Franco Mills MD   2 tablet at 06/16/19 2041    docusate sodium (COLACE) capsule 100 mg  100 mg Oral BID Franco Mills MD   100 mg at 06/17/19 0923    ondansetron (ZOFRAN) injection 4 mg  4 mg Intravenous Q6H PRN Franco Mills MD        morphine (PF) injection 2 mg  2 mg Intravenous Q2H PRN Franco Mills MD   2 mg at 06/14/19 1626    Or    morphine injection 4 mg  4 mg Intravenous Q2H PRN Franco Mills MD   4 mg at 06/14/19 1325    diazepam (VALIUM) tablet 5 mg  5 mg Oral Q6H PRN Franco Mills MD   5 mg at 06/14/19 1446    allopurinol (ZYLOPRIM) tablet 100 mg  100 mg Oral Daily Luisito Millan, APRN - CNP 100 mg at 06/17/19 9654    amiodarone (CORDARONE) tablet 100 mg  100 mg Oral Daily Flaquito Austina, APRN - CNP   100 mg at 06/17/19 2145    atorvastatin (LIPITOR) tablet 10 mg  10 mg Oral Daily Flaquito Dayton, APRN - CNP   10 mg at 06/16/19 2041    gabapentin (NEURONTIN) capsule 300 mg  300 mg Oral BID [place], APRN - CNP   300 mg at 06/17/19 0923    nitroGLYCERIN (NITROSTAT) SL tablet 0.4 mg  0.4 mg Sublingual Q5 Min PRN Flaquito Akira, APRN - CNP        tamsulosin (FLOMAX) capsule 0.4 mg  0.4 mg Oral Daily Flaquito Dayton, APRN - CNP   0.4 mg at 06/17/19 0923    carvedilol (COREG) tablet 6.25 mg  6.25 mg Oral BID WC [place], APRN - CNP   6.25 mg at 06/17/19 0926    torsemide (DEMADEX) tablet 20 mg  20 mg Oral Daily [place], APRN - CNP   20 mg at 06/17/19 0923       OBJECTIVE  PHYSICAL EXAM:   /85   Pulse 128   Temp 97.7 °F (36.5 °C)   Resp 18   Ht 6' (1.829 m)   Wt 210 lb (95.3 kg)   SpO2 96%   BMI 28.48 kg/m²   Body mass index is 28.48 kg/m². CONSTITUTIONAL:  awake, alert, cooperative, no apparent distress, and appears stated age  NECK:  Supple, symmetrical, trachea midline, no adenopathy, thyroid symmetric, not enlarged and no tenderness, skin normal  BACK:  Symmetric, no curvature, spinous processes are non-tender on palpation, paraspinous muscles are non-tender on palpation, no costal vertebral tenderness  LUNGS:  No increased work of breathing, good air exchange, clear to auscultation bilaterally, no crackles or wheezing  CARDIOVASCULAR:  Normal apical impulse, regular rate and rhythm, normal S1 and S2, no S3 or S4, and no murmur noted  ABDOMEN:  No scars, normal bowel sounds, soft, non-distended, non-tender, no masses palpated, no hepatosplenomegally  MUSCULOSKELETAL:  There is no redness, warmth, or swelling of the joints. Full range of motion noted. Motor strength is 5 out of 5 all extremities bilaterally.   Tone is normal.  NEUROLOGIC:  Awake, alert, oriented to name, place and time. Cranial nerves II-XII are grossly intact. Motor is 5 out of 5 bilaterally. Cerebellar finger to nose, heel to shin intact. Sensory is intact. Babinski down going, Romberg negative, and gait is normal.  SKIN:  no bruising or bleeding, no lesions and no jaundice    DATA:     Recent Results (from the past 24 hour(s))   RENAL FUNCTION PANEL    Collection Time: 06/17/19  5:30 AM   Result Value Ref Range    Sodium 138 135 - 144 mEq/L    Potassium 4.2 3.4 - 4.9 mEq/L    Chloride 102 95 - 107 mEq/L    CO2 26 20 - 31 mEq/L    Anion Gap 10 9 - 15 mEq/L    Glucose 128 (H) 70 - 99 mg/dL    BUN 23 8 - 23 mg/dL    CREATININE 0.76 0.70 - 1.20 mg/dL    GFR Non-African American >60.0 >60    GFR  >60.0 >60    Calcium 8.6 8.5 - 9.9 mg/dL    Phosphorus 2.6 2.3 - 4.8 mg/dL    Alb 2.7 (L) 3.5 - 4.6 g/dL   Magnesium    Collection Time: 06/17/19  5:30 AM   Result Value Ref Range    Magnesium 1.8 1.7 - 2.4 mg/dL   Protime-INR    Collection Time: 06/17/19  5:30 AM   Result Value Ref Range    Protime 12.0 (H) 9.0 - 11.5 sec    INR 1.2    SPECIMEN REJECTION    Collection Time: 06/17/19  5:48 AM   Result Value Ref Range    Rejected Test cbcnd     Reason for Rejection see below    CBC    Collection Time: 06/17/19  6:03 AM   Result Value Ref Range    WBC 8.8 4.8 - 10.8 K/uL    RBC 2.88 (L) 4.70 - 6.10 M/uL    Hemoglobin 10.1 (L) 14.0 - 18.0 g/dL    Hematocrit 28.9 (L) 42.0 - 52.0 %    .4 (H) 80.0 - 100.0 fL    MCH 35.0 (H) 27.0 - 31.3 pg    MCHC 34.8 33.0 - 37.0 %    RDW 14.6 (H) 11.5 - 14.5 %    Platelets 480 796 - 133 K/uL       ASSESSMENT AND PLAN   1. Lumbar stenosis s/p [id number] per Dr. Harinder Fink  2. Hx of A-fib on anticoagulation  3. CAD  4. HTN-continue antihypertensive meds  5.   BPH      SIGNATURE: Cielo Chester PATIENT NAME: Anthony Youngblood   DATE: June 17, 2019 MRN: [id number]   TIME: 11:43 AM PAGER: [phone]     Dr. Teena Friedman, [address] [place] [address] Physician

## 2019-06-17 NOTE — FLOWSHEET NOTE
Assist ambulate to bathroom. Pt requesting laxative. Heavy assist x1 and walker. Family at bedside.  Electronically signed by Evelyn Napoles RN on 6/17/2019 at 2:53 PM

## 2019-06-17 NOTE — PROGRESS NOTES
Physical Therapy Med Surg Daily Treatment Note  Facility/Department: [facility]  Room: N226/N226-01       NAME: Brandon Salcedo  : 1934 (80 y.o.)  MRN: [id number]  CODE STATUS: Full Code    Date of Service: 2019    Patient Diagnosis(es): Lumbar stenosis with neurogenic claudication [M48.062]   No chief complaint on file. Patient Active Problem List    Diagnosis Date Noted    Lumbar stenosis with neurogenic claudication 2019        Past Medical History:   Diagnosis Date    [facility] ([facility]     BPH (benign prostatic hyperplasia)     CAD (coronary artery disease)     Chronic indwelling Fermin catheter     Gout     HLD (hyperlipidemia)     HTN (hypertension)     Lumbar stenosis     Lung cancer ([facility]      Past Surgical History:   Procedure Laterality Date    CARPAL TUNNEL RELEASE Left     LUNG REMOVAL, PARTIAL Right          Restrictions  Restrictions/Precautions: Fall Risk  Position Activity Restriction  Spinal Precautions: No Bending, No Lifting, No Twisting  Spinal Precautions: LSO on OOB and for ambulation    Subjective   General  Chart Reviewed: Yes  Family / Caregiver Present: Yes  Subjective  Subjective: Pt reports not in pain as hasn't moved yet this morning. Pre Treatment Pain Screening  Pain at present: 0  Scale Used: Numeric Score    Pain Screening  Patient Currently in Pain: Yes     Pain Reassessment:   Pain Assessment  Pain Assessment: 0-10  Pain Level: 2  Pain Type: Surgical pain  Pain Location: Back  Pain Orientation: Mid;Lower  Pain Descriptors: Aching; Sore  Pain Frequency: Intermittent  Clinical Progression: Gradually improving         Objective   Bed mobility  Bridging: Independent  Rolling to Left: Contact guard assistance  Supine to Sit: Minimal assistance    Transfers  Sit to Stand: Minimal Assistance  Stand to sit: Minimal Assistance  Comment: Pt requires increase time with transfers.   Verbal cues with physical assist for hand placement with poor carryover    Ambulation  Ambulation?: Yes  Ambulation 1  Surface: level tile  Device: Rolling Walker  Assistance: Minimal assistance  Quality of Gait: Forward flexed posture with shuffling gait. Gait Deviations: Slow Temi;Decreased step length  Distance: 24ft  Comments: Educated on correct fit and position of LSO  Stairs/Curb  Stairs?: No          Exercises  Hip Flexion: x20  Hip Abduction: x20  Knee Long Arc Quad: x20  Ankle Pumps: x20  Comments: hip adduction with pillow x 20        Assessment   Body structures, Functions, Activity limitations: Decreased balance;Decreased endurance;Decreased safe awareness  Assessment: Pt requires increase time for sit to stand transfer. Discharge Recommendations:  Continue to assess pending progress, Patient would benefit from continued therapy after discharge    Goals  Short term goals  Short term goal 1: Pt to complete HEP with indep  Long term goals  Long term goal 1: Pt to complete bed mobility with indep  Long term goal 2: Pt to complete all transfers with SBA  Long term goal 3: Pt to ambulate 50-150ft with LRD and SBA    Plan    Plan  Times per week: 3-6  Current Treatment Recommendations: Strengthening, ROM, Balance Training, ADL/Self-care Training, Transfer Training, Functional Mobility Training, Home Exercise Program, Equipment Evaluation, Education, & procurement, Safety Education & Training, Patient/Caregiver Education & Training, Stair training, Modalities, Gait Training, Neuromuscular Re-education, Endurance Training  Safety Devices  Type of devices:  All fall risk precautions in place     [facility] (6 CLICK) BASIC MOBILITY  AM-PAC Inpatient Mobility Raw Score : 16   Therapy Time   Individual   Time In 0915   Time Out 0944   Minutes 29      gait 10  BM/TT 12  There ex 7       Brigida Joyner PTA, 06/17/19 at 9:55 AM

## 2019-06-17 NOTE — CARE COORDINATION
Updated note from this am. Wife is aware that [facility] in [place] does not have a contract with Pts insurance and the cost of $120.00 a day for 5 days. . Wife selected 2 different SNFs and LSW faxed info to them[facility] [address] .Electronically signed by SLOANE Dejesus on 6/17/19 at 2:27 PM

## 2019-06-17 NOTE — CARE COORDINATION
Message left again with Jenifer Broderick at [facility] to see if they can take Pt. Info was faxed 3 times and LSW had copy that info was faxed. Lala Rothman Electronically signed by SLOANE Newman on 6/17/2019 at 4:06 PM

## 2019-06-17 NOTE — PROGRESS NOTES
Clinical Pharmacy Note    Warfarin consult follow-up    Recent Labs     06/17/19  0530   INR 1.2     Recent Labs     06/15/19  0532 06/16/19  0603 06/17/19  0603   HGB 11.6* 9.5* 10.1*   HCT 33.6* 27.6* 28.9*    111* 131       Significant drug:drug interactions:  Patient continues on amiodarone, gabapentin, and atorvastatin    Date INR Warfarin Dose   06/15/19 1.3 7.5   06/16/19 1.2  10 mg    06/17/19  1.2 10 mg                                     Notes:  INR remains sub therapeutic for goal 2-3. Will dose with 10 mg tonight,    Daily PT/INR until stable within therapeutic range.

## 2019-06-18 LAB
ALBUMIN SERPL-MCNC: 2.9 G/DL (ref 3.5–4.6)
ANION GAP SERPL CALCULATED.3IONS-SCNC: 11 MEQ/L (ref 9–15)
BUN BLDV-MCNC: 19 MG/DL (ref 8–23)
CALCIUM SERPL-MCNC: 8.6 MG/DL (ref 8.5–9.9)
CHLORIDE BLD-SCNC: 98 MEQ/L (ref 95–107)
CO2: 27 MEQ/L (ref 20–31)
CREAT SERPL-MCNC: 0.55 MG/DL (ref 0.7–1.2)
GFR AFRICAN AMERICAN: >60
GFR NON-AFRICAN AMERICAN: >60
GLUCOSE BLD-MCNC: 126 MG/DL (ref 70–99)
HCT VFR BLD CALC: 30.9 % (ref 42–52)
HEMOGLOBIN: 10.8 G/DL (ref 14–18)
INR BLD: 1.3
MAGNESIUM: 2.4 MG/DL (ref 1.7–2.4)
MCH RBC QN AUTO: 35.2 PG (ref 27–31.3)
MCHC RBC AUTO-ENTMCNC: 34.9 % (ref 33–37)
MCV RBC AUTO: 100.9 FL (ref 80–100)
PDW BLD-RTO: 14.6 % (ref 11.5–14.5)
PHOSPHORUS: 2.7 MG/DL (ref 2.3–4.8)
PLATELET # BLD: 150 K/UL (ref 130–400)
POTASSIUM SERPL-SCNC: 4.4 MEQ/L (ref 3.4–4.9)
PROTHROMBIN TIME: 13.1 SEC (ref 9–11.5)
RBC # BLD: 3.06 M/UL (ref 4.7–6.1)
SODIUM BLD-SCNC: 136 MEQ/L (ref 135–144)
WBC # BLD: 7.6 K/UL (ref 4.8–10.8)

## 2019-06-18 PROCEDURE — 80069 RENAL FUNCTION PANEL: CPT

## 2019-06-18 PROCEDURE — 97116 GAIT TRAINING THERAPY: CPT

## 2019-06-18 PROCEDURE — 97535 SELF CARE MNGMENT TRAINING: CPT

## 2019-06-18 PROCEDURE — 36415 COLL VENOUS BLD VENIPUNCTURE: CPT

## 2019-06-18 PROCEDURE — 6370000000 HC RX 637 (ALT 250 FOR IP): Performed by: ORTHOPAEDIC SURGERY

## 2019-06-18 PROCEDURE — 85610 PROTHROMBIN TIME: CPT

## 2019-06-18 PROCEDURE — 85027 COMPLETE CBC AUTOMATED: CPT

## 2019-06-18 PROCEDURE — 6370000000 HC RX 637 (ALT 250 FOR IP): Performed by: INTERNAL MEDICINE

## 2019-06-18 PROCEDURE — 2580000003 HC RX 258: Performed by: ORTHOPAEDIC SURGERY

## 2019-06-18 PROCEDURE — 83735 ASSAY OF MAGNESIUM: CPT

## 2019-06-18 PROCEDURE — 1210000000 HC MED SURG R&B

## 2019-06-18 PROCEDURE — 6370000000 HC RX 637 (ALT 250 FOR IP): Performed by: NURSE PRACTITIONER

## 2019-06-18 RX ORDER — WARFARIN SODIUM 5 MG/1
10 TABLET ORAL ONCE
Status: COMPLETED | OUTPATIENT
Start: 2019-06-18 | End: 2019-06-18

## 2019-06-18 RX ADMIN — TORSEMIDE 20 MG: 20 TABLET ORAL at 08:53

## 2019-06-18 RX ADMIN — DIAZEPAM 5 MG: 5 TABLET ORAL at 20:27

## 2019-06-18 RX ADMIN — WARFARIN SODIUM 10 MG: 5 TABLET ORAL at 18:30

## 2019-06-18 RX ADMIN — MAGNESIUM OXIDE TAB 400 MG (241.3 MG ELEMENTAL MG) 400 MG: 400 (241.3 MG) TAB at 08:53

## 2019-06-18 RX ADMIN — GABAPENTIN 300 MG: 300 CAPSULE ORAL at 20:02

## 2019-06-18 RX ADMIN — GABAPENTIN 300 MG: 300 CAPSULE ORAL at 08:53

## 2019-06-18 RX ADMIN — MAGNESIUM OXIDE TAB 400 MG (241.3 MG ELEMENTAL MG) 400 MG: 400 (241.3 MG) TAB at 20:02

## 2019-06-18 RX ADMIN — TAMSULOSIN HYDROCHLORIDE 0.4 MG: 0.4 CAPSULE ORAL at 08:54

## 2019-06-18 RX ADMIN — Medication 10 ML: at 20:02

## 2019-06-18 RX ADMIN — ATORVASTATIN CALCIUM 10 MG: 10 TABLET, FILM COATED ORAL at 20:02

## 2019-06-18 RX ADMIN — AMIODARONE HYDROCHLORIDE 100 MG: 200 TABLET ORAL at 08:53

## 2019-06-18 RX ADMIN — ALLOPURINOL 100 MG: 100 TABLET ORAL at 08:53

## 2019-06-18 RX ADMIN — DOCUSATE SODIUM 100 MG: 100 CAPSULE, LIQUID FILLED ORAL at 20:02

## 2019-06-18 RX ADMIN — CARVEDILOL 6.25 MG: 6.25 TABLET, FILM COATED ORAL at 08:53

## 2019-06-18 RX ADMIN — Medication 10 ML: at 08:54

## 2019-06-18 ASSESSMENT — PAIN SCALES - GENERAL
PAINLEVEL_OUTOF10: 0
PAINLEVEL_OUTOF10: 0

## 2019-06-18 NOTE — FLOWSHEET NOTE
3561-- pt assessment complete, pt alert and oriented x 4, denies pain, denies n/v, denies sob & cp, call light in reach, bed in lowest position, bed alarm engaged, will monitor.

## 2019-06-18 NOTE — PROGRESS NOTES
INPATIENT PROGRESS NOTE    SERVICE DATE:  6/18/2019   SERVICE TIME:  4:16 PM      SUBJECTIVE    INTERVAL HPI: 80year old male who is awaiting precertification to be transferred to [facility] in [address] [facility].  Sung cp sob ha rash anx n v d f    MEDICATIONS:    Current Facility-Administered Medications   Medication Dose Route Frequency Provider Last Rate Last Dose    warfarin (COUMADIN) tablet 10 mg  10 mg Oral Once Viraj Gilman MD        bisacodyl (DULCOLAX) suppository 10 mg  10 mg Rectal Daily PRN USHA Davison   10 mg at 06/17/19 2133    magnesium hydroxide (MILK OF MAGNESIA) 400 MG/5ML suspension 30 mL  30 mL Oral Daily PRN USHA Davison        polyethylene glycol (GLYCOLAX) packet 17 g  17 g Oral Daily Viraj Gilman MD   Stopped at 06/18/19 0844    magnesium oxide (MAG-OX) tablet 400 mg  400 mg Oral BID Viraj Gilman MD   400 mg at 06/18/19 1677    warfarin (COUMADIN) daily dosing (placeholder)   Other RX Placeholder Viraj Gilman MD   Stopped at 06/15/19 1208    0.9 % sodium chloride infusion   Intravenous Continuous Jay Godwin MD   Stopped at 06/15/19 1100    sodium chloride flush 0.9 % injection 10 mL  10 mL Intravenous 2 times per day Jay Godwin MD   10 mL at 06/18/19 0854    sodium chloride flush 0.9 % injection 10 mL  10 mL Intravenous PRN Jay Godwin MD        oxyCODONE-acetaminophen (PERCOCET) 5-325 MG per tablet 1 tablet  1 tablet Oral Q4H PRN Jay Godwin MD   1 tablet at 06/17/19 2132    Or    oxyCODONE-acetaminophen (PERCOCET) 5-325 MG per tablet 2 tablet  2 tablet Oral Q4H PRN Jay Godwin MD   2 tablet at 06/16/19 2041    docusate sodium (COLACE) capsule 100 mg  100 mg Oral BID Jay Godwin MD   Stopped at 06/18/19 0847    ondansetron (ZOFRAN) injection 4 mg  4 mg Intravenous Q6H PRN Jay Godwin MD        morphine (PF) injection 2 mg  2 mg Intravenous Q2H PRN aJy Godwin MD   2 mg at 06/14/19 1626    Or    morphine injection 4 mg  4 mg Intravenous Q2H PRN Sterling Sheikh MD   4 mg at 06/14/19 1325    diazepam (VALIUM) tablet 5 mg  5 mg Oral Q6H PRN Sterling Sheikh MD   5 mg at 06/14/19 1446    allopurinol (ZYLOPRIM) tablet 100 mg  100 mg Oral Daily Vonzella Gregory, APRN - CNP   100 mg at 06/18/19 0853    amiodarone (CORDARONE) tablet 100 mg  100 mg Oral Daily Vonzella Gregory, APRN - CNP   100 mg at 06/18/19 0853    atorvastatin (LIPITOR) tablet 10 mg  10 mg Oral Daily Vonzella Gregory, APRN - CNP   10 mg at 06/17/19 2137    gabapentin (NEURONTIN) capsule 300 mg  300 mg Oral BID Vonzella Gregory, APRN - CNP   300 mg at 06/18/19 0853    nitroGLYCERIN (NITROSTAT) SL tablet 0.4 mg  0.4 mg Sublingual Q5 Min PRN Vonzella Gregory, APRN - CNP        tamsulosin (FLOMAX) capsule 0.4 mg  0.4 mg Oral Daily Vonzella Gregory, APRN - CNP   0.4 mg at 06/18/19 0854    carvedilol (COREG) tablet 6.25 mg  6.25 mg Oral BID WC Vonzella Gregory, APRN - CNP   6.25 mg at 06/18/19 0853    torsemide (DEMADEX) tablet 20 mg  20 mg Oral Daily Vonzella Gregory, APRN - CNP   20 mg at 06/18/19 0853       OBJECTIVE  PHYSICAL EXAM:   /73   Pulse 72   Temp 98.2 °F (36.8 °C) (Oral)   Resp 18   Ht 6' (1.829 m)   Wt 210 lb (95.3 kg)   SpO2 96%   BMI 28.48 kg/m²   Body mass index is 28.48 kg/m².   CONSTITUTIONAL:  awake, alert, cooperative, no apparent distress, and appears stated age  NECK:  Supple, symmetrical, trachea midline, no adenopathy, thyroid symmetric, not enlarged and no tenderness, skin normal  BACK:  Symmetric, no curvature, spinous processes are non-tender on palpation, paraspinous muscles are non-tender on palpation, no costal vertebral tenderness  LUNGS:  No increased work of breathing, good air exchange, clear to auscultation bilaterally, no crackles or wheezing  CARDIOVASCULAR:  Normal apical impulse, regular rate and rhythm, normal S1 and S2, no S3 or S4, and no murmur noted  ABDOMEN:  No scars, normal bowel sounds, soft, non-distended, non-tender, no masses palpated, no hepatosplenomegally  MUSCULOSKELETAL:  There is no redness, warmth, or swelling of the joints. Full range of motion noted. Motor strength is 5 out of 5 all extremities bilaterally. Tone is normal.  NEUROLOGIC:  Awake, alert, oriented to name, place and time. Cranial nerves II-XII are grossly intact. Motor is 5 out of 5 bilaterally. Cerebellar finger to nose, heel to shin intact. Sensory is intact. Babinski down going, Romberg negative, and gait is normal.  SKIN:  no bruising or bleeding, no lesions and no jaundice    DATA:     Recent Results (from the past 24 hour(s))   RENAL FUNCTION PANEL    Collection Time: 06/18/19  5:40 AM   Result Value Ref Range    Sodium 136 135 - 144 mEq/L    Potassium 4.4 3.4 - 4.9 mEq/L    Chloride 98 95 - 107 mEq/L    CO2 27 20 - 31 mEq/L    Anion Gap 11 9 - 15 mEq/L    Glucose 126 (H) 70 - 99 mg/dL    BUN 19 8 - 23 mg/dL    CREATININE 0.55 (L) 0.70 - 1.20 mg/dL    GFR Non-African American >60.0 >60    GFR  >60.0 >60    Calcium 8.6 8.5 - 9.9 mg/dL    Phosphorus 2.7 2.3 - 4.8 mg/dL    Alb 2.9 (L) 3.5 - 4.6 g/dL   Magnesium    Collection Time: 06/18/19  5:40 AM   Result Value Ref Range    Magnesium 2.4 1.7 - 2.4 mg/dL   Protime-INR    Collection Time: 06/18/19  5:40 AM   Result Value Ref Range    Protime 13.1 (H) 9.0 - 11.5 sec    INR 1.3    CBC    Collection Time: 06/18/19  5:41 AM   Result Value Ref Range    WBC 7.6 4.8 - 10.8 K/uL    RBC 3.06 (L) 4.70 - 6.10 M/uL    Hemoglobin 10.8 (L) 14.0 - 18.0 g/dL    Hematocrit 30.9 (L) 42.0 - 52.0 %    .9 (H) 80.0 - 100.0 fL    MCH 35.2 (H) 27.0 - 31.3 pg    MCHC 34.9 33.0 - 37.0 %    RDW 14.6 (H) 11.5 - 14.5 %    Platelets 868 679 - 373 K/uL       ASSESSMENT AND PLAN   1. Lumbar stenosis s/p L3-S1 s/p laminectomy per Dr. Heather Lima  2. Hx of A-fib on anticoagulation  3. CAD  4. HTN-continue antihypertensive meds  5.   BPD      SIGNATURE: USHA Singh Caro PATIENT NAME: Quinn Larry   DATE: June 18, 2019 MRN: [id number]   TIME: 4:16 PM PAGER: [phone]     Dr. Manuel Squires, [address] [place] [address]

## 2019-06-18 NOTE — PROGRESS NOTES
Clinical Pharmacy Note    Warfarin consult follow-up    Recent Labs     06/18/19  0540   INR 1.3     Recent Labs     06/16/19  0603 06/17/19  0603 06/18/19  0541   HGB 9.5* 10.1* 10.8*   HCT 27.6* 28.9* 30.9*   * 131 150       Significant drug:drug interactions:  New warfarin drug-drug interactions: n/a  Discontinued drug-drug interactions: n/a    Notes:  Date INR Warfarin Dose   06/15/19 1.3 7.5   06/16/19 1.2  10 mg    06/17/19  1.2 10 mg   06/18/19 1.3 10 mg                               INR today= 1.3 has increased slightly but is still subtherapeutic. Will repeat warfarin 10mg today. Daily PT/INR until stable within therapeutic range.      Elijah Jc [facility]  06/18/19  3:30 PM

## 2019-06-18 NOTE — PROGRESS NOTES
DAILY PROGRESS NOTE      Patient doing well  Vitals:    19 2327   BP: 111/64   Pulse: 67   Resp:    Temp:    SpO2: 94%      Temp (24hrs), Av.8 °F (36.6 °C), Min:97.2 °F (36.2 °C), Max:98.4 °F (36.9 °C)       Pain Control Good  No chest pain or shortness of breath. No calf pain    Exam:   Incision(s): no drainage  Dressing clean, dry, and intact  Operative extremity shows neuro vascular status intact. Flexion and extension intact on operative extremity  Calves soft and non-tender without evidence of DVT. .      Labs reviewed:  CBC:   Recent Labs     19  0603 19  0603 19  0541   WBC 7.9 8.8 7.6   HGB 9.5* 10.1* 10.8*   * 131 150     BMP:    Recent Labs     19  0603 19  0530 19  0540    138 136   K 4.2 4.2 4.4    102 98   CO2 25 26 27   BUN 22 23 19   CREATININE 0.89 0.76 0.55*   GLUCOSE 120* 128* 126*       I&O  I/O last 3 completed shifts: In: 1560 [P.O.:1560]  Out: 2600 [Urine:2600]      Assessment:  Good Post Operative Course.     Plan:  PLIF lumbar spine  Dc to SNF      Ursula Riojas MD  2019 7:30 AM

## 2019-06-18 NOTE — CARE COORDINATION
SLOANE met with Pt, wife and son to let them know that precert was started and that [facility] has a bed for Pt. BABAK AND 7000 COMPLETED. Cole Restrepo Electronically signed by Willaim Halsted, LSW on 6/18/2019 at 2:03 PM

## 2019-06-18 NOTE — PROGRESS NOTES
Physical Therapy Med Surg Daily Treatment Note  [facility]  Room: N226/N226-01       NAME: Rodrigue Bryant  : 1934 (80 y.o.)  MRN: [id number]  CODE STATUS: Full Code    Date of Service: 2019    Patient Diagnosis(es): Lumbar stenosis with neurogenic claudication [M48.062]   No chief complaint on file. Patient Active Problem List    Diagnosis Date Noted    Lumbar stenosis with neurogenic claudication 2019        Past Medical History:   Diagnosis Date    [facility]     BPH (benign prostatic hyperplasia)     CAD (coronary artery disease)     Chronic indwelling Fermin catheter     Gout     HLD (hyperlipidemia)     HTN (hypertension)     Lumbar stenosis     Lung cancer ([facility]      Past Surgical History:   Procedure Laterality Date    CARPAL TUNNEL RELEASE Left     LUNG REMOVAL, PARTIAL Right           Restrictions:  Restrictions/Precautions: Fall Risk  Position Activity Restriction  Spinal Precautions: No Bending, No Lifting, No Twisting  Spinal Precautions: LSO on OOB and for ambulation    SUBJECTIVE:  General  Chart Reviewed: Yes  Family / Caregiver Present: Yes(spouse and son. )  Subjective  Subjective: pt reports no pain at rest.     Pre Pain Assessment:  Pre Treatment Pain Screening  Pain at present: 0  Scale Used: Numeric Score  Intervention List: Patient able to continue with treatment  Pain Screening  Patient Currently in Pain: No       Post Pain Assessment:   Pain Assessment  Pain Assessment: 0-10  Pain Level: 0       OBJECTIVE:         Bed mobility  Comment: DNT pt in chair before and after tx. Transfers  Sit to Stand: Minimal Assistance  Stand to sit: Minimal Assistance  Comment: increased time and effort needed to complete, vc's for extending knees. Ambulation  Ambulation?: Yes  Ambulation 1  Surface: level tile  Device: Rolling Walker  Assistance: Contact guard assistance  Quality of Gait: Forward flexed posture with shuffling gait.   Gait Deviations: Slow Temi;Decreased step length  Distance: 30' x2   Comments: Educated on correct fit and position of LSO, pt with difficulty donning/doffing LSO. Pt at times speeds up to an unsafe pace, vc's to correct. Exercises  Hip Flexion: x20  Knee Long Arc Quad: x20  Ankle Pumps: x20                     ASSESSMENT:  Body structures, Functions, Activity limitations: Decreased balance;Decreased endurance;Decreased safe awareness    Assessment: increased time for education on donning/doffing LSO, pt able to increased ambulation distance but reports increased fatigue. Pt able to recall 0/3 spinal precautions, education and reinforcement given. Activity Tolerance  Activity Tolerance: Patient Tolerated treatment well       Discharge Recommendations:  Continue to assess pending progress, Patient would benefit from continued therapy after discharge    Goals:  Short term goals  Short term goal 1: Pt to complete HEP with indep  Long term goals  Long term goal 1: Pt to complete bed mobility with indep  Long term goal 2: Pt to complete all transfers with SBA  Long term goal 3: Pt to ambulate 50-150ft with LRD and SBA    PLAN:   Plan  Times per week: 3-6  Current Treatment Recommendations: Strengthening, ROM, Balance Training, ADL/Self-care Training, Transfer Training, Functional Mobility Training, Home Exercise Program, Equipment Evaluation, Education, & procurement, Safety Education & Training, Patient/Caregiver Education & Training, Stair training, Modalities, Gait Training, Neuromuscular Re-education, Endurance Training  Safety Devices  Type of devices:  All fall risk precautions in place, Call light within reach, Chair alarm in place, Left in chair     [facility] (6 CLICK) Michel Parisi 28 Inpatient Mobility Raw Score : 17      Therapy Time   Individual   Time In 0927   Time Out 0954   Minutes 27      Gait: 15  Trsf: 8  Therex: 4        Cornell Chang PTA, 06/18/19 at 10:01 AM

## 2019-06-18 NOTE — CARE COORDINATION
TANIAW spoke to Pierre Erickson at [address] [phone] again they did not receive the whole fax that was faxed numerous times. TANIAW refaxed more info this am. Awaiting from Pierre Erickson to see if they can take Pt and to start precert. .Electronically signed by SLOANE Dejesus on 6/18/2019 at 8:55 AM

## 2019-06-19 VITALS
RESPIRATION RATE: 18 BRPM | BODY MASS INDEX: 28.44 KG/M2 | OXYGEN SATURATION: 99 % | HEIGHT: 72 IN | SYSTOLIC BLOOD PRESSURE: 127 MMHG | DIASTOLIC BLOOD PRESSURE: 73 MMHG | TEMPERATURE: 97.2 F | HEART RATE: 70 BPM | WEIGHT: 210 LBS

## 2019-06-19 PROBLEM — G56.02 CARPAL TUNNEL SYNDROME ON LEFT: Status: ACTIVE | Noted: 2017-06-24

## 2019-06-19 LAB
ALBUMIN SERPL-MCNC: 2.8 G/DL (ref 3.5–4.6)
ANION GAP SERPL CALCULATED.3IONS-SCNC: 10 MEQ/L (ref 9–15)
BUN BLDV-MCNC: 21 MG/DL (ref 8–23)
CALCIUM SERPL-MCNC: 8.9 MG/DL (ref 8.5–9.9)
CHLORIDE BLD-SCNC: 96 MEQ/L (ref 95–107)
CO2: 31 MEQ/L (ref 20–31)
CREAT SERPL-MCNC: 0.77 MG/DL (ref 0.7–1.2)
GFR AFRICAN AMERICAN: >60
GFR NON-AFRICAN AMERICAN: >60
GLUCOSE BLD-MCNC: 112 MG/DL (ref 70–99)
HCT VFR BLD CALC: 28.4 % (ref 42–52)
HEMOGLOBIN: 10 G/DL (ref 14–18)
INR BLD: 1.7
MAGNESIUM: 2.3 MG/DL (ref 1.7–2.4)
MCH RBC QN AUTO: 35.1 PG (ref 27–31.3)
MCHC RBC AUTO-ENTMCNC: 35.2 % (ref 33–37)
MCV RBC AUTO: 99.9 FL (ref 80–100)
PDW BLD-RTO: 14.5 % (ref 11.5–14.5)
PHOSPHORUS: 2.9 MG/DL (ref 2.3–4.8)
PLATELET # BLD: 147 K/UL (ref 130–400)
POTASSIUM SERPL-SCNC: 3.7 MEQ/L (ref 3.4–4.9)
PROTHROMBIN TIME: 17 SEC (ref 9–11.5)
RBC # BLD: 2.85 M/UL (ref 4.7–6.1)
SODIUM BLD-SCNC: 137 MEQ/L (ref 135–144)
WBC # BLD: 5.8 K/UL (ref 4.8–10.8)

## 2019-06-19 PROCEDURE — 85027 COMPLETE CBC AUTOMATED: CPT

## 2019-06-19 PROCEDURE — 83735 ASSAY OF MAGNESIUM: CPT

## 2019-06-19 PROCEDURE — 6370000000 HC RX 637 (ALT 250 FOR IP): Performed by: NURSE PRACTITIONER

## 2019-06-19 PROCEDURE — 6370000000 HC RX 637 (ALT 250 FOR IP): Performed by: INTERNAL MEDICINE

## 2019-06-19 PROCEDURE — 85610 PROTHROMBIN TIME: CPT

## 2019-06-19 PROCEDURE — 36415 COLL VENOUS BLD VENIPUNCTURE: CPT

## 2019-06-19 PROCEDURE — 6370000000 HC RX 637 (ALT 250 FOR IP): Performed by: ORTHOPAEDIC SURGERY

## 2019-06-19 PROCEDURE — 97116 GAIT TRAINING THERAPY: CPT

## 2019-06-19 PROCEDURE — 2580000003 HC RX 258: Performed by: ORTHOPAEDIC SURGERY

## 2019-06-19 PROCEDURE — 97535 SELF CARE MNGMENT TRAINING: CPT

## 2019-06-19 PROCEDURE — 80069 RENAL FUNCTION PANEL: CPT

## 2019-06-19 RX ORDER — WARFARIN SODIUM 5 MG/1
TABLET ORAL
Qty: 31 TABLET | Refills: 0 | DISCHARGE
Start: 2019-06-19 | End: 2019-07-19

## 2019-06-19 RX ORDER — AMIODARONE HYDROCHLORIDE 100 MG/1
100 TABLET ORAL DAILY
Qty: 30 TABLET | Refills: 3 | Status: SHIPPED | OUTPATIENT
Start: 2019-06-20

## 2019-06-19 RX ADMIN — DOCUSATE SODIUM 100 MG: 100 CAPSULE, LIQUID FILLED ORAL at 09:26

## 2019-06-19 RX ADMIN — MAGNESIUM OXIDE TAB 400 MG (241.3 MG ELEMENTAL MG) 400 MG: 400 (241.3 MG) TAB at 09:27

## 2019-06-19 RX ADMIN — GABAPENTIN 300 MG: 300 CAPSULE ORAL at 09:26

## 2019-06-19 RX ADMIN — POLYETHYLENE GLYCOL 3350 17 G: 17 POWDER, FOR SOLUTION ORAL at 09:27

## 2019-06-19 RX ADMIN — CARVEDILOL 6.25 MG: 6.25 TABLET, FILM COATED ORAL at 09:46

## 2019-06-19 RX ADMIN — AMIODARONE HYDROCHLORIDE 100 MG: 200 TABLET ORAL at 09:27

## 2019-06-19 RX ADMIN — ALLOPURINOL 100 MG: 100 TABLET ORAL at 09:27

## 2019-06-19 RX ADMIN — Medication 10 ML: at 09:32

## 2019-06-19 RX ADMIN — TAMSULOSIN HYDROCHLORIDE 0.4 MG: 0.4 CAPSULE ORAL at 09:27

## 2019-06-19 RX ADMIN — TORSEMIDE 20 MG: 20 TABLET ORAL at 09:27

## 2019-06-19 ASSESSMENT — PAIN SCALES - GENERAL
PAINLEVEL_OUTOF10: 0

## 2019-06-19 NOTE — FLOWSHEET NOTE
2430-- pt assessment complete, pt alert and oriented x 4, denies pain, denies n/v, denies sob & cp, bed in lowest position, bed alarm engaged, call light in reach, will continue to monitor. 1521-- hep lock d/c'ed, tele removed, discharge papers given to patients wife, pt discharged with wife to SNF via wheelchair.

## 2019-06-19 NOTE — PROGRESS NOTES
Clinical Pharmacy Note    Warfarin consult follow-up    Recent Labs     06/19/19  0543   INR 1.7     Recent Labs     06/17/19  0603 06/18/19  0541 06/19/19  0543   HGB 10.1* 10.8* 10.0*   HCT 28.9* 30.9* 28.4*    150 147       Notes:  Date INR Warfarin Dose   06/15/19 1.3 7.5   06/16/19 1.2  10 mg    06/17/19  1.2 10 mg   06/18/19 1.3 10 mg   06/19/19 1.7 7.5 mg                     Pt's INR is still subtherapeutic at 1.7. Pt to receive Warfarin 7.5 mg today. Daily PT/INR until stable within therapeutic range.     [facility]  Staff Pharmacist  6/19/2019  1:53 PM

## 2019-06-19 NOTE — CARE COORDINATION
We have precert approval for pt to transfer to SNF today. Family aware and they are here to pick pt up to transport him to SNF.

## 2019-06-19 NOTE — CARE COORDINATION
SLOANE spoke with [facility] at [facility], no word. She was given [employer] phone no to call when she gets precert. .Electronically signed by SLOANE Schaffer on 6/19/2019 at 9:01 AM

## 2019-06-19 NOTE — CARE COORDINATION
I MET WITH THE PT THIS AM. HE STATES HE WANTS TO GET DISCHARGED TODAY. HE IS AWARE THAT WE ARE WAITING FOR A PRECERT FOR [facility] IN [place]. THE WIFE PLANS ON TRANSPORTING PT TO THE SNF AND HER SON KAYLYNNL FOLLOW IN HIS CAR.

## 2019-06-19 NOTE — PROGRESS NOTES
1  Surface: level tile  Device: Rolling Walker  Assistance: Contact guard assistance  Quality of Gait: Forward flexed posture with shuffling gait. Gait Deviations: Slow Temi;Decreased step length  Distance: 39' x2   Comments: pt with difficulty donning/doffing LSO. Pt at times speeds up to an unsafe pace, vc's to correct. Slight buckle of L hip noted, pt able to self correct. ASSESSMENT:  Body structures, Functions, Activity limitations: Decreased balance;Decreased endurance;Decreased safe awareness    Assessment: increased time for education on donning/doffing LSO along with log roll, pt able to increase ambulation distance but reports increased fatigue. Pt able to recall 0/3 spinal precautions, education and reinforcement given. Activity Tolerance  Activity Tolerance: Patient Tolerated treatment well       Discharge Recommendations:  Continue to assess pending progress, Patient would benefit from continued therapy after discharge    Goals:  Short term goals  Short term goal 1: Pt to complete HEP with indep  Long term goals  Long term goal 1: Pt to complete bed mobility with indep  Long term goal 2: Pt to complete all transfers with SBA  Long term goal 3: Pt to ambulate 50-150ft with LRD and SBA    PLAN:   Plan  Times per week: 3-6  Current Treatment Recommendations: Strengthening, ROM, Balance Training, ADL/Self-care Training, Transfer Training, Functional Mobility Training, Home Exercise Program, Equipment Evaluation, Education, & procurement, Safety Education & Training, Patient/Caregiver Education & Training, Stair training, Modalities, Gait Training, Neuromuscular Re-education, Endurance Training  Safety Devices  Type of devices:  All fall risk precautions in place, Call light within reach, Chair alarm in place, Left in chair     [facility] (6 CLICK) [id number]quin Mobility Raw Score : 17      Therapy Time   Individual   Time In 1029   Time Out 1052   Minutes

## 2019-06-20 NOTE — PROGRESS NOTES
Physical Therapy  Facility/Department: [facility] MED SURG [id number]  Physical Therapy Discharge      NAME: Edgar Tran    : 1934 (80 y.o.)  MRN: [id number]    Account: [de-identified]  Gender: male      Patient has been discharged from acute care hospital. DC patient from current PT program.      Electronically signed by Priya Montes, PT on 19 at 4:25 PM

## 2019-06-26 NOTE — DISCHARGE SUMMARY
Discharge summary  This patient Veronica Montejo was admitted to the hospital on 6/14/2019  after undergoing Procedure(s) (LRB):  SPINE, L3-4 L5-S1 LAMINECTOMY L5-S1 INSTRUMENTED POSTERIOR INTERBODY FUSION AND POSTERIOR LATERAL FUSION (N/A) without complications that morning. During the postoperative period,while in hospital, patient was medically managed by the hospitalist. Please see medial notes and H&P for patients additional diagnoses. Ortho agrees with all medical diagnoses and treatments while patient in hospital.    Hospital course  Patient tolerated surgical procedure well and there was no complications. Patient progressed adequtly through their recovery during hospital stay including PT and rehabilitation. DVT prophylaxis was implemented POD#1  Patient was then D/C on 6/19/2019 to Rehab  in stable condition. Patient was instructed on the use of pain medications, the signs and symptoms of infection, and was given our number to call should they have any questions or concerns following discharge. No significant findings labs or x-rays.

## 2019-06-27 ENCOUNTER — HOSPITAL ENCOUNTER (OUTPATIENT)
Dept: GENERAL RADIOLOGY | Age: 84
Discharge: HOME | End: 2019-06-29
Payer: MEDICARE

## 2019-06-27 DIAGNOSIS — M54.5 LOW BACK PAIN, UNSPECIFIED BACK PAIN LATERALITY, UNSPECIFIED CHRONICITY, WITH SCIATICA PRESENCE UNSPECIFIED: ICD-10-CM

## 2019-06-27 PROCEDURE — 72100 X-RAY EXAM L-S SPINE 2/3 VWS: CPT

## (undated) DEVICE — CHLORAPREP 26ML ORANGE

## (undated) DEVICE — ELECTROSURGICAL PENCIL BUTTON SWITCH E-Z CLEAN COATED BLADE ELECTRODE 10 FT (3 M) CORD HOLSTER: Brand: MEGADYNE

## (undated) DEVICE — SHEET,DRAPE,53X77,STERILE: Brand: MEDLINE

## (undated) DEVICE — INTENDED FOR TISSUE SEPARATION, AND OTHER PROCEDURES THAT REQUIRE A SHARP SURGICAL BLADE TO PUNCTURE OR CUT.: Brand: BARD-PARKER ® CARBON RIB-BACK BLADES

## (undated) DEVICE — CODMAN® SURGICAL PATTIES 1/2" X 1/2" (1.27CM X 1.27CM): Brand: CODMAN®

## (undated) DEVICE — KIT JACK TBL PT CARE

## (undated) DEVICE — PROBE PEDCL L165MM CANN W/ MOD JAMSH NDL NO2 MOD MOD

## (undated) DEVICE — SPONGE GZ W4XL4IN COT 12 PLY TYP VII WVN C FLD DSGN

## (undated) DEVICE — MARKER SURG SKIN GENTIAN VLT REG TIP W/ 6IN RUL

## (undated) DEVICE — DRAPE SURG W41XL74IN CLR FULL SZ C ARM 3 ADH POLY STRP E

## (undated) DEVICE — 1010 S-DRAPE TOWEL DRAPE 10/BX: Brand: STERI-DRAPE™

## (undated) DEVICE — SYRINGE IRRIG 60ML SFT PLIABLE BLB EZ TO GRP 1 HND USE W/

## (undated) DEVICE — 1.6MM K-WIRE, 500MM, BLUNT TIP
Type: IMPLANTABLE DEVICE | Status: NON-FUNCTIONAL
Removed: 2019-06-14

## (undated) DEVICE — HYPODERMIC SAFETY NEEDLE: Brand: MAGELLAN

## (undated) DEVICE — PACK,LAPAROTOMY,NO GOWNS: Brand: MEDLINE

## (undated) DEVICE — GOWN,AURORA,NONRNF,XL,30/CS: Brand: MEDLINE

## (undated) DEVICE — CORD,CAUTERY,BIPOLAR,STERILE: Brand: MEDLINE

## (undated) DEVICE — CONTAINER,SPECIMEN,OR STERILE,4OZ: Brand: MEDLINE

## (undated) DEVICE — GLOVE ORANGE PI 7 1/2   MSG9075

## (undated) DEVICE — 3M™ IOBAN™ 2 ANTIMICROBIAL INCISE DRAPE 6650EZ: Brand: IOBAN™ 2

## (undated) DEVICE — PRECISION MATCH HEAD

## (undated) DEVICE — 3M™ STERI-DRAPE™ INSTRUMENT POUCH 1018: Brand: STERI-DRAPE™

## (undated) DEVICE — SPONGE,LAP,18"X18",DLX,XR,ST,5/PK,40/PK: Brand: MEDLINE

## (undated) DEVICE — LABEL MED MINI W/ MARKER

## (undated) DEVICE — DRAPE,ISOLATION,INCISE,INVISISHIELD: Brand: MEDLINE

## (undated) DEVICE — FRAZIER SUCTION INSTRUMENT 12 FR W/CONTROL VENT & OBTURATOR: Brand: FRAZIER

## (undated) DEVICE — CATHETER IV 16 GAX2 IN STR INTROCAN SAFETY

## (undated) DEVICE — SUTURE MCRYL SZ 3-0 L27IN ABSRB UD L24MM PS-1 3/8 CIR PRIM Y936H

## (undated) DEVICE — SUTURE SZ 0 27IN 5/8 CIR UR-6  TAPER PT VIOLET ABSRB VICRYL J603H

## (undated) DEVICE — ALCOHOL RUBBING ISO 16OZ 70%

## (undated) DEVICE — COVER MICSCP W46XL120IN 4 BINOC GLS LENS LEICA

## (undated) DEVICE — FLOSEAL MATRIX IS INDICATED IN SURGICAL PROCEDURES (OTHER THAN IN OPHTHALMIC) AS AN ADJUNCT TO HEMOSTASIS WHEN CONTROL OF BLEEDING BY LIGATURE OR CONVENTIONAL PROCEDURES IS INEFFECTIVE OR IMPRACTICAL.: Brand: FLOSEAL HEMOSTATIC MATRIX

## (undated) DEVICE — WAX SURG 2.5GM HEMSTAT BNE BEESWAX PARAFFIN ISO PALMITATE

## (undated) DEVICE — ELECTRODE PT RET AD L9FT HI MOIST COND ADH HYDRGEL CORDED

## (undated) DEVICE — GLOVE ORANGE PI 8 1/2   MSG9085

## (undated) DEVICE — 35 ML SYRINGE LUER-LOCK TIP: Brand: MONOJECT

## (undated) DEVICE — TAPE,ELASTIC,FOAM,CURAD,3"X5.5YD,LF: Brand: CURAD

## (undated) DEVICE — SUTURE MCRYL SZ 3-0 L27IN ABSRB UD L19MM PS-2 3/8 CIR PRIM Y427H

## (undated) DEVICE — HANDLE PRB CANN DETACH FOR GRP MOD MOD PEDIGUARD

## (undated) DEVICE — 3M™ IOBAN™ 2 ANTIMICROBIAL INCISE DRAPE 6651EZ: Brand: IOBAN™ 2

## (undated) DEVICE — GOWN,AURORA,NONREINFORCED,LARGE: Brand: MEDLINE

## (undated) DEVICE — SPONGE,PEANUT,XRAY,ST,SM,3/8",5/CARD: Brand: MEDLINE INDUSTRIES, INC.

## (undated) DEVICE — CODMAN® SURGICAL PATTIES 3/4" X 3/4" (1.91CM X 1.91CM): Brand: CODMAN®

## (undated) DEVICE — 3M™ STERI-STRIP™ REINFORCED ADHESIVE SKIN CLOSURES, R1547, 1/2 IN X 4 IN (12 MM X 100 MM), 6 STRIPS/ENVELOPE: Brand: 3M™ STERI-STRIP™

## (undated) DEVICE — SUTURE VCRL + SZ 2-0 L18IN ABSRB UD CP-2 L26MM 1/2 CIR REV VCP762D

## (undated) DEVICE — TUBING, SUCTION, 1/4" X 20', STRAIGHT: Brand: MEDLINE INDUSTRIES, INC.

## (undated) DEVICE — SYRINGE MED 10ML LUERLOCK TIP W/O SFTY DISP

## (undated) DEVICE — COUNTER NDL 40 COUNT HLD 70 FOAM BLK ADH W/ MAG

## (undated) DEVICE — E-Z CLEAN, NON-STICK, PTFE COATED, ELECTROSURGICAL BLADE ELECTRODE, MODIFIED EXTENDED INSULATION, 6.5 INCH (16.5 CM): Brand: MEGADYNE